# Patient Record
Sex: FEMALE | Race: WHITE | Employment: OTHER | RURAL
[De-identification: names, ages, dates, MRNs, and addresses within clinical notes are randomized per-mention and may not be internally consistent; named-entity substitution may affect disease eponyms.]

---

## 2017-07-25 ENCOUNTER — HOSPITAL ENCOUNTER (OUTPATIENT)
Dept: OTHER | Age: 57
Discharge: HOME | End: 2017-07-25
Attending: NURSE PRACTITIONER | Admitting: NURSE PRACTITIONER

## 2017-07-26 LAB
A/G RATIO: 1.9 (ref 0.8–2)
ALBUMIN SERPL-MCNC: 4.3 G/DL (ref 3.4–4.8)
ALP BLD-CCNC: 61 U/L (ref 25–100)
ALT SERPL-CCNC: 12 U/L (ref 4–36)
ANION GAP SERPL CALCULATED.3IONS-SCNC: 12 MMOL/L (ref 3–16)
AST SERPL-CCNC: 22 U/L (ref 8–33)
BASOPHILS ABSOLUTE: 0 K/UL (ref 0–0.1)
BASOPHILS RELATIVE PERCENT: 0.5 %
BILIRUB SERPL-MCNC: 0.3 MG/DL (ref 0.3–1.2)
BUN BLDV-MCNC: 21 MG/DL (ref 6–20)
CALCIUM SERPL-MCNC: 9.4 MG/DL (ref 8.5–10.5)
CHLORIDE BLD-SCNC: 107 MMOL/L (ref 98–107)
CO2: 24 MMOL/L (ref 20–30)
CREAT SERPL-MCNC: 0.7 MG/DL (ref 0.4–1.2)
EOSINOPHILS ABSOLUTE: 0.1 K/UL (ref 0–0.4)
EOSINOPHILS RELATIVE PERCENT: 1.1 %
GFR AFRICAN AMERICAN: >59
GFR NON-AFRICAN AMERICAN: >60
GLOBULIN: 2.3 G/DL
GLUCOSE BLD-MCNC: 115 MG/DL (ref 74–106)
HCT VFR BLD CALC: 44 % (ref 37–47)
HEMOGLOBIN: 14.8 G/DL (ref 11.5–16.5)
LYMPHOCYTES ABSOLUTE: 1.7 K/UL (ref 1.5–4)
LYMPHOCYTES RELATIVE PERCENT: 21.1 % (ref 20–40)
MCH RBC QN AUTO: 31.9 PG (ref 27–32)
MCHC RBC AUTO-ENTMCNC: 33.6 G/DL (ref 31–35)
MCV RBC AUTO: 94.8 FL (ref 80–100)
MONOCYTES ABSOLUTE: 0.6 K/UL (ref 0.2–0.8)
MONOCYTES RELATIVE PERCENT: 6.7 % (ref 3–10)
NEUTROPHILS ABSOLUTE: 5.8 K/UL (ref 2–7.5)
NEUTROPHILS RELATIVE PERCENT: 70.6 %
PDW BLD-RTO: 14 % (ref 11–16)
PLATELET # BLD: 258 K/UL (ref 150–400)
PMV BLD AUTO: 11.7 FL (ref 6–10)
POTASSIUM SERPL-SCNC: 4.7 MMOL/L (ref 3.4–5.1)
RBC # BLD: 4.64 M/UL (ref 3.8–5.8)
SODIUM BLD-SCNC: 143 MMOL/L (ref 136–145)
TOTAL PROTEIN: 6.6 G/DL (ref 6.4–8.3)
WBC # BLD: 8.2 K/UL (ref 4–11)

## 2017-07-28 ENCOUNTER — HOSPITAL ENCOUNTER (OUTPATIENT)
Dept: OTHER | Age: 57
Discharge: HOME | End: 2017-07-28
Attending: NURSE PRACTITIONER | Admitting: NURSE PRACTITIONER

## 2017-07-28 LAB
A/G RATIO: 1.8 (ref 0.8–2)
ALBUMIN SERPL-MCNC: 4.8 G/DL (ref 3.4–4.8)
ALP BLD-CCNC: 67 U/L (ref 25–100)
ALT SERPL-CCNC: 16 U/L (ref 4–36)
ANION GAP SERPL CALCULATED.3IONS-SCNC: 14 MMOL/L (ref 3–16)
AST SERPL-CCNC: 23 U/L (ref 8–33)
BASOPHILS ABSOLUTE: 0 K/UL (ref 0–0.1)
BASOPHILS RELATIVE PERCENT: 0.1 %
BILIRUB SERPL-MCNC: 0.7 MG/DL (ref 0.3–1.2)
BUN BLDV-MCNC: 21 MG/DL (ref 6–20)
CALCIUM SERPL-MCNC: 10.3 MG/DL (ref 8.5–10.5)
CHLORIDE BLD-SCNC: 104 MMOL/L (ref 98–107)
CO2: 26 MMOL/L (ref 20–30)
CREAT SERPL-MCNC: 0.7 MG/DL (ref 0.4–1.2)
EOSINOPHILS ABSOLUTE: 0 K/UL (ref 0–0.4)
EOSINOPHILS RELATIVE PERCENT: 0 %
GFR AFRICAN AMERICAN: >59
GFR NON-AFRICAN AMERICAN: >60
GLOBULIN: 2.7 G/DL
GLUCOSE BLD-MCNC: 143 MG/DL (ref 74–106)
HCT VFR BLD CALC: 44.1 % (ref 37–47)
HEMOGLOBIN: 14.9 G/DL (ref 11.5–16.5)
LYMPHOCYTES ABSOLUTE: 0.8 K/UL (ref 1.5–4)
LYMPHOCYTES RELATIVE PERCENT: 10.9 % (ref 20–40)
MCH RBC QN AUTO: 31.8 PG (ref 27–32)
MCHC RBC AUTO-ENTMCNC: 33.8 G/DL (ref 31–35)
MCV RBC AUTO: 94.2 FL (ref 80–100)
MONOCYTES ABSOLUTE: 0.2 K/UL (ref 0.2–0.8)
MONOCYTES RELATIVE PERCENT: 1.9 % (ref 3–10)
NEUTROPHILS ABSOLUTE: 6.7 K/UL (ref 2–7.5)
NEUTROPHILS RELATIVE PERCENT: 87.1 %
PDW BLD-RTO: 13.5 % (ref 11–16)
PLATELET # BLD: 267 K/UL (ref 150–400)
PMV BLD AUTO: 11.5 FL (ref 6–10)
POTASSIUM SERPL-SCNC: 5.2 MMOL/L (ref 3.4–5.1)
RBC # BLD: 4.68 M/UL (ref 3.8–5.8)
SODIUM BLD-SCNC: 144 MMOL/L (ref 136–145)
TOTAL PROTEIN: 7.5 G/DL (ref 6.4–8.3)
WBC # BLD: 7.7 K/UL (ref 4–11)

## 2017-08-16 ENCOUNTER — OFFICE VISIT (OUTPATIENT)
Dept: SURGERY | Facility: CLINIC | Age: 57
End: 2017-08-16

## 2017-08-16 VITALS
DIASTOLIC BLOOD PRESSURE: 68 MMHG | HEART RATE: 67 BPM | BODY MASS INDEX: 20.14 KG/M2 | HEIGHT: 69 IN | OXYGEN SATURATION: 100 % | SYSTOLIC BLOOD PRESSURE: 128 MMHG | TEMPERATURE: 98.6 F | WEIGHT: 136 LBS

## 2017-08-16 DIAGNOSIS — L03.116 CELLULITIS OF LEFT LOWER EXTREMITY: ICD-10-CM

## 2017-08-16 DIAGNOSIS — R60.0 EDEMA OF LEFT FOOT: Primary | ICD-10-CM

## 2017-08-16 PROCEDURE — 99203 OFFICE O/P NEW LOW 30 MIN: CPT | Performed by: SURGERY

## 2017-08-16 RX ORDER — TOPIRAMATE 25 MG/1
25 TABLET ORAL 2 TIMES DAILY
COMMUNITY
End: 2019-04-26

## 2017-08-16 RX ORDER — ESTRADIOL 0.5 MG/1
0.5 TABLET ORAL DAILY
COMMUNITY
End: 2019-04-26 | Stop reason: SDUPTHER

## 2017-08-16 RX ORDER — SUMATRIPTAN 100 MG/1
100 TABLET, FILM COATED ORAL
COMMUNITY
End: 2019-04-26 | Stop reason: SDUPTHER

## 2017-08-16 NOTE — PROGRESS NOTES
Patient: Sharda Broderick    YOB: 1960    Date: 08/16/2017    Primary Care Provider: JUANITA Bansal    Reason for Consultation: Swollen left foot    Chief complaint:   Chief Complaint   Patient presents with   • Foot Swelling     Swollen left foot after being stung       Subjective .     History of present illness:  The patient is here today for the evaluation and treatment of a swollen left foot after being stung by some sort of insect about a month ago.  She states that her left foot was swollen, red and painful.  The swelling went past the knee but has since then gone down. Her symptoms have much improved since she was first stung. She still complains tenderness, swelling and redness.  She does notice an improvement when she keeps her foot elevated and after she took some antibiotics.      Review of Systems   Constitutional: Negative for chills, fever and unexpected weight change.   HENT: Negative for hearing loss, trouble swallowing and voice change.    Eyes: Negative for visual disturbance.   Respiratory: Negative for apnea, cough, chest tightness, shortness of breath and wheezing.    Cardiovascular: Positive for leg swelling (left foot). Negative for chest pain and palpitations.   Gastrointestinal: Negative for abdominal distention, abdominal pain, anal bleeding, blood in stool, constipation, diarrhea, nausea, rectal pain and vomiting.   Endocrine: Negative for cold intolerance and heat intolerance.   Genitourinary: Negative for difficulty urinating, dysuria and flank pain.   Musculoskeletal: Negative for back pain and gait problem.   Skin: Positive for color change. Negative for rash and wound.   Neurological: Negative for dizziness, syncope, speech difficulty, weakness, light-headedness, numbness and headaches.   Hematological: Negative for adenopathy. Does not bruise/bleed easily.   Psychiatric/Behavioral: Negative for confusion. The patient is not nervous/anxious.   "      Allergies:  No Known Allergies    Medications:    Current Outpatient Prescriptions:   •  estradiol (ESTRACE) 0.5 MG tablet, Take 0.5 mg by mouth Daily., Disp: , Rfl:   •  SUMAtriptan (IMITREX) 100 MG tablet, Take 100 mg by mouth Every 2 (Two) Hours As Needed for Migraine., Disp: , Rfl:   •  topiramate (TOPAMAX) 25 MG tablet, Take 25 mg by mouth 2 (Two) Times a Day., Disp: , Rfl:     History\"  History reviewed. No pertinent past medical history.    Past Surgical History:   Procedure Laterality Date   • HYSTERECTOMY         Family History   Problem Relation Age of Onset   • Hypertension Mother    • Heart disease Mother    • COPD Mother    • No Known Problems Brother    • Heart disease Brother    • COPD Brother        Social History   Substance Use Topics   • Smoking status: Current Every Day Smoker     Packs/day: 0.25     Types: Cigarettes   • Smokeless tobacco: Never Used   • Alcohol use No        Objective     Vital Signs:   /68 (BP Location: Left arm)  Pulse 67  Temp 98.6 °F (37 °C) (Temporal Artery )   Ht 69\" (175.3 cm)  Wt 136 lb (61.7 kg)  SpO2 100%  BMI 20.08 kg/m2    Physical Exam:   General Appearance:    Alert, cooperative, in no acute distress   Head:    Normocephalic, without obvious abnormality, atraumatic   Eyes:            Lids and lashes normal, conjunctivae and sclerae normal, no   icterus, no pallor, corneas clear, PERRLA   Ears:    Ears appear intact with no abnormalities noted   Throat:   No oral lesions, no thrush, oral mucosa moist   Neck:   No adenopathy, supple, trachea midline, no thyromegaly, no   carotid bruit, no JVD   Lungs:     Clear to auscultation,respirations regular, even and                  unlabored    Heart:    Regular rhythm and normal rate, normal S1 and S2, no            murmur, no gallop, no rub, no click   Chest Wall:    No abnormalities observed   Abdomen:     Normal bowel sounds, no masses, no organomegaly, soft        non-tender, non-distended, no " guarding, no rebound                tenderness   Extremities:   Moves all extremities well, no edema, no cyanosis, no             Redness.  Patient has mild redness on the left foot, some hemosiderin deposits.  Excellent pulses.     Pulses:   Pulses palpable and equal bilaterally   Skin:   No bleeding, bruising or rash   Lymph nodes:   No palpable adenopathy   Neurologic:   Cranial nerves 2 - 12 grossly intact, sensation intact, DTR       present and equal bilaterally     Results Review:   I reviewed the patient's new clinical results.    Assessment/Plan     1. Edema of left foot    2. Cellulitis of left lower extremity        Cellulitis completely resolved, minimal edema.  Ultrasound indicates no fluid collection or abscess in the foot.  Patient reassured, okay to resume regular activities.  Recommend compression stockings during the day for prolonged walking and to limit edema and discomfort.  Follow-up as needed    I discussed the patients findings and my recommendations with patient    Electronically signed by Regino Toure MD  08/16/17      .  Scribed for Regino Toure MD by Holly Mcnair. 8/16/2017  11:33 AM

## 2019-04-26 ENCOUNTER — OFFICE VISIT (OUTPATIENT)
Dept: INTERNAL MEDICINE | Facility: CLINIC | Age: 59
End: 2019-04-26

## 2019-04-26 VITALS
WEIGHT: 147.8 LBS | TEMPERATURE: 98.1 F | OXYGEN SATURATION: 94 % | SYSTOLIC BLOOD PRESSURE: 102 MMHG | DIASTOLIC BLOOD PRESSURE: 80 MMHG | HEIGHT: 69 IN | BODY MASS INDEX: 21.89 KG/M2 | HEART RATE: 55 BPM

## 2019-04-26 DIAGNOSIS — N95.1 MENOPAUSAL SYNDROME: ICD-10-CM

## 2019-04-26 DIAGNOSIS — G43.909 MIGRAINE WITHOUT STATUS MIGRAINOSUS, NOT INTRACTABLE, UNSPECIFIED MIGRAINE TYPE: ICD-10-CM

## 2019-04-26 DIAGNOSIS — Z12.31 BREAST CANCER SCREENING BY MAMMOGRAM: ICD-10-CM
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
## 2019-04-26 PROCEDURE — 99204 OFFICE O/P NEW MOD 45 MIN: CPT | Performed by: FAMILY MEDICINE

## 2019-04-26 RX ORDER — TOPIRAMATE 25 MG/1
25 TABLET ORAL DAILY
Qty: 90 TABLET | Refills: 2 | Status: SHIPPED | OUTPATIENT
Start: 2019-04-26 | End: 2020-11-22 | Stop reason: SDUPTHER

## 2019-04-26 RX ORDER — VENLAFAXINE HYDROCHLORIDE 37.5 MG/1
37.5 CAPSULE, EXTENDED RELEASE ORAL DAILY
Qty: 90 CAPSULE | Refills: 1 | Status: SHIPPED | OUTPATIENT
Start: 2019-04-26 | End: 2019-11-26

## 2019-04-26 RX ORDER — TOPIRAMATE 25 MG/1
25 TABLET ORAL DAILY
COMMUNITY
End: 2019-04-26 | Stop reason: SDUPTHER

## 2019-04-26 RX ORDER — SUMATRIPTAN 100 MG/1
100 TABLET, FILM COATED ORAL
Qty: 9 TABLET | Refills: 5 | Status: SHIPPED | OUTPATIENT
Start: 2019-04-26 | End: 2020-05-28 | Stop reason: SDUPTHER

## 2019-04-26 RX ORDER — CLOBETASOL PROPIONATE 0.5 MG/ML
LOTION TOPICAL 2 TIMES DAILY
Qty: 118 ML | Refills: 1 | Status: SHIPPED | OUTPATIENT
Start: 2019-04-26 | End: 2019-11-26

## 2019-04-26 RX ORDER — ESTRADIOL 0.5 MG/1
0.5 TABLET ORAL DAILY
Qty: 90 TABLET | Refills: 1 | Status: SHIPPED | OUTPATIENT
Start: 2019-04-26 | End: 2019-11-06 | Stop reason: SDUPTHER

## 2019-04-26 NOTE — PROGRESS NOTES
Sharda Broderick is a 58 y.o. female.    Chief Complaint   Patient presents with   • Insect Bite     all over arms   • Establish Care   • Migraine       HPI   Patient presents today to establish care.  She states that she has chigger bites to arm bilaterally and ankles. She hasn't used anything to help with this issue  She is requesting a steroid shot.     Patient has migraines.  She reports taking imitrex as needed for actue relief of migraines with good response.  She takes this medication rarely.  She reports that she only takes 25mg of topamax daily due to side effects of tinnitus.      Patient reports menopausal symptoms of hot flashes.  She has been on estrace for many years.  She takes 1mg off and on a few days out of the week.      Patient is not up to date on colonoscopy and does not wish to have this done at this time.  She is willing to be scheduled for a mammogram.   The following portions of the patient's history were reviewed and updated as appropriate: allergies, current medications, past family history, past medical history, past social history, past surgical history and problem list.     Past Medical History:   Diagnosis Date   • Headache        Past Surgical History:   Procedure Laterality Date   • HYSTERECTOMY      complete   • TOOTH EXTRACTION         Family History   Problem Relation Age of Onset   • Hypertension Mother    • Heart disease Mother    • COPD Mother    • No Known Problems Brother    • Heart disease Brother    • COPD Brother        Social History     Socioeconomic History   • Marital status: Unknown     Spouse name: Not on file   • Number of children: Not on file   • Years of education: Not on file   • Highest education level: Not on file   Tobacco Use   • Smoking status: Former Smoker     Packs/day: 0.25     Types: Cigarettes     Last attempt to quit:      Years since quittin.3   • Smokeless tobacco: Never Used   Substance and Sexual Activity   • Alcohol use: No     Comment:  "rarely   • Drug use: No   • Sexual activity: Yes     Partners: Male     Birth control/protection: Surgical       No Known Allergies      Current Outpatient Medications:   •  estradiol (ESTRACE) 0.5 MG tablet, Take 1 tablet by mouth Daily., Disp: 90 tablet, Rfl: 1  •  SUMAtriptan (IMITREX) 100 MG tablet, Take 1 tablet by mouth Every 2 (Two) Hours As Needed for Migraine., Disp: 9 tablet, Rfl: 5  •  topiramate (TOPAMAX) 25 MG tablet, Take 1 tablet by mouth Daily., Disp: 90 tablet, Rfl: 2  •  clobetasol (CLOBEX) 0.05 % lotion, Apply  topically to the appropriate area as directed 2 (Two) Times a Day., Disp: 118 mL, Rfl: 1  •  venlafaxine XR (EFFEXOR XR) 37.5 MG 24 hr capsule, Take 1 capsule by mouth Daily., Disp: 90 capsule, Rfl: 1    ROS    Review of Systems   Constitutional: Negative for chills, fatigue and fever.   HENT: Negative for congestion, postnasal drip and sore throat.    Eyes: Negative for blurred vision and visual disturbance.   Respiratory: Negative for cough, shortness of breath and wheezing.    Cardiovascular: Negative for chest pain and leg swelling.   Gastrointestinal: Negative for abdominal pain, constipation, diarrhea, nausea and vomiting.   Endocrine: Positive for heat intolerance. Negative for cold intolerance.   Genitourinary: Negative for dysuria and frequency.   Musculoskeletal: Negative for arthralgias and back pain.   Skin: Positive for rash. Negative for color change.   Allergic/Immunologic: Negative for environmental allergies.   Neurological: Positive for headache. Negative for weakness and numbness.   Hematological: Does not bruise/bleed easily.   Psychiatric/Behavioral: Negative for depressed mood. The patient is nervous/anxious.        Vitals:    04/26/19 1041   BP: 102/80   BP Location: Left arm   Patient Position: Sitting   Cuff Size: Adult   Pulse: 55   Temp: 98.1 °F (36.7 °C)   TempSrc: Oral   SpO2: 94%   Weight: 67 kg (147 lb 12.8 oz)   Height: 175.3 cm (69\")     Body mass index is " 21.83 kg/m².    Physical Exam     Physical Exam   Constitutional: She is oriented to person, place, and time. She appears well-developed and well-nourished. No distress.   HENT:   Head: Normocephalic and atraumatic.   Right Ear: External ear normal.   Left Ear: External ear normal.   Mouth/Throat: Oropharynx is clear and moist.   Eyes: Conjunctivae and EOM are normal. Pupils are equal, round, and reactive to light.   Neck: Normal range of motion. Neck supple.   Cardiovascular: Normal rate and regular rhythm.   No murmur heard.  Pulmonary/Chest: Effort normal and breath sounds normal. No respiratory distress. She has no wheezes.   Abdominal: Soft. Bowel sounds are normal. She exhibits no distension. There is no tenderness.   Musculoskeletal: She exhibits no edema or deformity.   Lymphadenopathy:     She has no cervical adenopathy.   Neurological: She is alert and oriented to person, place, and time. No cranial nerve deficit.   Skin: Skin is warm and dry. Rash (multiple insect bites to arms bilaterally) noted.   Psychiatric: She has a normal mood and affect. Her behavior is normal.       Assessment/Plan    Problem List Items Addressed This Visit        Cardiovascular and Mediastinum    Migraine without status migrainosus, not intractable     Improved with topamax.  I'm concerned the patient may not be taking topamax regularly and is likely not doing much good.  However, migraines are rare and she may take imitrex prn.               Relevant Medications    SUMAtriptan (IMITREX) 100 MG tablet    topiramate (TOPAMAX) 25 MG tablet    venlafaxine XR (EFFEXOR XR) 37.5 MG 24 hr capsule       Other    Menopausal syndrome     Advised to decrease estrace to 0.5 mg and will start effexor 37.5mg.          Bite, insect - Primary     Agree with patient that she likely has chigger bites.  Discussed that there is no way to necessarily get rid of them, but can help associated itching and welts with steroid lotion.            Other  Visit Diagnoses     Breast cancer screening by mammogram        Relevant Orders    Mammo Diagnostic Digital Tomosynthesis Right With CAD          New Medications Ordered This Visit   Medications   • SUMAtriptan (IMITREX) 100 MG tablet     Sig: Take 1 tablet by mouth Every 2 (Two) Hours As Needed for Migraine.     Dispense:  9 tablet     Refill:  5   • topiramate (TOPAMAX) 25 MG tablet     Sig: Take 1 tablet by mouth Daily.     Dispense:  90 tablet     Refill:  2   • clobetasol (CLOBEX) 0.05 % lotion     Sig: Apply  topically to the appropriate area as directed 2 (Two) Times a Day.     Dispense:  118 mL     Refill:  1   • estradiol (ESTRACE) 0.5 MG tablet     Sig: Take 1 tablet by mouth Daily.     Dispense:  90 tablet     Refill:  1   • venlafaxine XR (EFFEXOR XR) 37.5 MG 24 hr capsule     Sig: Take 1 capsule by mouth Daily.     Dispense:  90 capsule     Refill:  1       No orders of the defined types were placed in this encounter.      Return for Annual.      Marisol Catherine DO

## 2019-04-29 ENCOUNTER — TELEPHONE (OUTPATIENT)
Dept: INTERNAL MEDICINE | Facility: CLINIC | Age: 59
End: 2019-04-29

## 2019-04-29 PROBLEM — N95.1 MENOPAUSAL SYNDROME: Status: ACTIVE | Noted: 2019-04-29

## 2019-04-29 PROBLEM — G43.909 MIGRAINE WITHOUT STATUS MIGRAINOSUS, NOT INTRACTABLE: Status: ACTIVE | Noted: 2019-04-29
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
## 2019-04-30 ENCOUNTER — TELEPHONE (OUTPATIENT)
Dept: INTERNAL MEDICINE | Facility: CLINIC | Age: 59
End: 2019-04-30

## 2019-04-30 NOTE — ASSESSMENT & PLAN NOTE
Agree with patient that she likely has chigger bites.  Discussed that there is no way to necessarily get rid of them, but can help associated itching and welts with steroid lotion.

## 2019-04-30 NOTE — ASSESSMENT & PLAN NOTE
Improved with topamax.  I'm concerned the patient may not be taking topamax regularly and is likely not doing much good.  However, migraines are rare and she may take imitrex prn.

## 2019-04-30 NOTE — TELEPHONE ENCOUNTER
Patient called stating that the pharmacy did not have the meds that prescribed for her. She states she has only received 2 of the 5 medications that were ordered on the 26th.    She is specifically requesting that the topical lotion for the chigger bites be taken care of ASAP because they are causing the patient extreme discomfort.    Please advise.

## 2019-11-06 RX ORDER — ESTRADIOL 0.5 MG/1
TABLET ORAL
Qty: 90 TABLET | Refills: 0 | Status: SHIPPED | OUTPATIENT
Start: 2019-11-06 | End: 2020-11-22 | Stop reason: SDUPTHER

## 2019-11-22 ENCOUNTER — HOSPITAL ENCOUNTER (EMERGENCY)
Facility: HOSPITAL | Age: 59
Discharge: HOME | End: 2019-11-22
Attending: EMERGENCY MEDICINE
Payer: COMMERCIAL

## 2019-11-22 ENCOUNTER — APPOINTMENT (OUTPATIENT)
Dept: GENERAL RADIOLOGY | Facility: HOSPITAL | Age: 59
End: 2019-11-22
Payer: COMMERCIAL

## 2019-11-22 VITALS
RESPIRATION RATE: 18 BRPM | WEIGHT: 150 LBS | BODY MASS INDEX: 22.22 KG/M2 | HEIGHT: 69 IN | SYSTOLIC BLOOD PRESSURE: 132 MMHG | HEART RATE: 80 BPM | OXYGEN SATURATION: 100 % | TEMPERATURE: 97.8 F | DIASTOLIC BLOOD PRESSURE: 67 MMHG

## 2019-11-22 DIAGNOSIS — S52.90XA CLOSED FRACTURE OF DISTAL END OF FOREARM, UNSPECIFIED LATERALITY, INITIAL ENCOUNTER: Primary | ICD-10-CM

## 2019-11-22 DIAGNOSIS — T14.8XXA PUNCTURE WOUND: ICD-10-CM

## 2019-11-22 PROCEDURE — 6360000002 HC RX W HCPCS: Performed by: EMERGENCY MEDICINE

## 2019-11-22 PROCEDURE — 6370000000 HC RX 637 (ALT 250 FOR IP)

## 2019-11-22 PROCEDURE — 2580000003 HC RX 258: Performed by: EMERGENCY MEDICINE

## 2019-11-22 PROCEDURE — 96365 THER/PROPH/DIAG IV INF INIT: CPT

## 2019-11-22 PROCEDURE — 99283 EMERGENCY DEPT VISIT LOW MDM: CPT

## 2019-11-22 PROCEDURE — 73090 X-RAY EXAM OF FOREARM: CPT

## 2019-11-22 PROCEDURE — 96372 THER/PROPH/DIAG INJ SC/IM: CPT

## 2019-11-22 RX ORDER — HYDROCODONE BITARTRATE AND ACETAMINOPHEN 7.5; 325 MG/1; MG/1
1 TABLET ORAL ONCE
Status: COMPLETED | OUTPATIENT
Start: 2019-11-22 | End: 2019-11-22

## 2019-11-22 RX ORDER — HYDROCODONE BITARTRATE AND ACETAMINOPHEN 7.5; 325 MG/1; MG/1
TABLET ORAL
Status: COMPLETED
Start: 2019-11-22 | End: 2019-11-22

## 2019-11-22 RX ORDER — MORPHINE SULFATE 4 MG/ML
8 INJECTION, SOLUTION INTRAMUSCULAR; INTRAVENOUS ONCE
Status: COMPLETED | OUTPATIENT
Start: 2019-11-22 | End: 2019-11-22

## 2019-11-22 RX ORDER — HYDROCODONE BITARTRATE AND ACETAMINOPHEN 5; 325 MG/1; MG/1
1 TABLET ORAL EVERY 6 HOURS PRN
Qty: 12 TABLET | Refills: 0 | Status: SHIPPED | OUTPATIENT
Start: 2019-11-22 | End: 2019-11-25

## 2019-11-22 RX ORDER — CEPHALEXIN 500 MG/1
500 CAPSULE ORAL 4 TIMES DAILY
Qty: 28 CAPSULE | Refills: 0 | Status: SHIPPED | OUTPATIENT
Start: 2019-11-22 | End: 2019-11-29

## 2019-11-22 RX ADMIN — CEFAZOLIN 1 G: 1 INJECTION, POWDER, FOR SOLUTION INTRAMUSCULAR; INTRAVENOUS at 17:45

## 2019-11-22 RX ADMIN — HYDROCODONE BITARTRATE AND ACETAMINOPHEN 1 TABLET: 7.5; 325 TABLET ORAL at 18:19

## 2019-11-22 RX ADMIN — MORPHINE SULFATE 8 MG: 4 INJECTION INTRAVENOUS at 16:12

## 2019-11-22 ASSESSMENT — ENCOUNTER SYMPTOMS
HEMATOCHEZIA: 0
ABDOMINAL PAIN: 0
BACK PAIN: 0
DIARRHEA: 0
SPUTUM PRODUCTION: 0
STRIDOR: 0
COUGH: 0
VOMITING: 0
SORE THROAT: 0
NAUSEA: 0
EYE DISCHARGE: 0
WHEEZING: 0
EYE REDNESS: 0
SHORTNESS OF BREATH: 0

## 2019-11-22 ASSESSMENT — PAIN SCALES - GENERAL
PAINLEVEL_OUTOF10: 7
PAINLEVEL_OUTOF10: 5

## 2019-11-26 ENCOUNTER — APPOINTMENT (OUTPATIENT)
Dept: PREADMISSION TESTING | Facility: HOSPITAL | Age: 59
End: 2019-11-26

## 2019-11-26 ENCOUNTER — OFFICE VISIT (OUTPATIENT)
Dept: ORTHOPEDIC SURGERY | Facility: CLINIC | Age: 59
End: 2019-11-26

## 2019-11-26 ENCOUNTER — PREP FOR SURGERY (OUTPATIENT)
Dept: OTHER | Facility: HOSPITAL | Age: 59
End: 2019-11-26

## 2019-11-26 VITALS — BODY MASS INDEX: 21.77 KG/M2 | HEIGHT: 69 IN | RESPIRATION RATE: 18 BRPM | WEIGHT: 147 LBS

## 2019-11-26 DIAGNOSIS — S52.602A CLOSED FRACTURE OF DISTAL END OF LEFT ULNA, UNSPECIFIED FRACTURE MORPHOLOGY, INITIAL ENCOUNTER: ICD-10-CM

## 2019-11-26 DIAGNOSIS — M25.532 ARTHRALGIA OF WRIST, LEFT: Primary | ICD-10-CM

## 2019-11-26 DIAGNOSIS — S52.602A CLOSED FRACTURE OF DISTAL END OF LEFT ULNA, UNSPECIFIED FRACTURE MORPHOLOGY, INITIAL ENCOUNTER: Primary | ICD-10-CM

## 2019-11-26 DIAGNOSIS — S50.812A ABRASION OF LEFT FOREARM, INITIAL ENCOUNTER: ICD-10-CM

## 2019-11-26 DIAGNOSIS — S69.92XA WRIST INJURY, LEFT, INITIAL ENCOUNTER: ICD-10-CM

## 2019-11-26 LAB
ALBUMIN SERPL-MCNC: 4.5 G/DL (ref 3.5–5.2)
ALBUMIN/GLOB SERPL: 1.3 G/DL
ALP SERPL-CCNC: 89 U/L (ref 39–117)
ALT SERPL W P-5'-P-CCNC: 11 U/L (ref 1–33)
ANION GAP SERPL CALCULATED.3IONS-SCNC: 12.5 MMOL/L (ref 5–15)
AST SERPL-CCNC: 20 U/L (ref 1–32)
BASOPHILS # BLD AUTO: 0.05 10*3/MM3 (ref 0–0.2)
BASOPHILS NFR BLD AUTO: 0.8 % (ref 0–1.5)
BILIRUB SERPL-MCNC: 0.2 MG/DL (ref 0.2–1.2)
BUN BLD-MCNC: 14 MG/DL (ref 6–20)
BUN/CREAT SERPL: 15.9 (ref 7–25)
CALCIUM SPEC-SCNC: 9.9 MG/DL (ref 8.6–10.5)
CHLORIDE SERPL-SCNC: 103 MMOL/L (ref 98–107)
CO2 SERPL-SCNC: 25.5 MMOL/L (ref 22–29)
CREAT BLD-MCNC: 0.88 MG/DL (ref 0.57–1)
DEPRECATED RDW RBC AUTO: 42.1 FL (ref 37–54)
EOSINOPHIL # BLD AUTO: 0.05 10*3/MM3 (ref 0–0.4)
EOSINOPHIL NFR BLD AUTO: 0.8 % (ref 0.3–6.2)
ERYTHROCYTE [DISTWIDTH] IN BLOOD BY AUTOMATED COUNT: 12.1 % (ref 12.3–15.4)
GFR SERPL CREATININE-BSD FRML MDRD: 66 ML/MIN/1.73
GLOBULIN UR ELPH-MCNC: 3.5 GM/DL
GLUCOSE BLD-MCNC: 101 MG/DL (ref 65–99)
HCT VFR BLD AUTO: 44.3 % (ref 34–46.6)
HGB BLD-MCNC: 14.9 G/DL (ref 12–15.9)
IMM GRANULOCYTES # BLD AUTO: 0.01 10*3/MM3 (ref 0–0.05)
IMM GRANULOCYTES NFR BLD AUTO: 0.2 % (ref 0–0.5)
LYMPHOCYTES # BLD AUTO: 1.73 10*3/MM3 (ref 0.7–3.1)
LYMPHOCYTES NFR BLD AUTO: 28.5 % (ref 19.6–45.3)
MCH RBC QN AUTO: 31.8 PG (ref 26.6–33)
MCHC RBC AUTO-ENTMCNC: 33.6 G/DL (ref 31.5–35.7)
MCV RBC AUTO: 94.5 FL (ref 79–97)
MONOCYTES # BLD AUTO: 0.36 10*3/MM3 (ref 0.1–0.9)
MONOCYTES NFR BLD AUTO: 5.9 % (ref 5–12)
NEUTROPHILS # BLD AUTO: 3.88 10*3/MM3 (ref 1.7–7)
NEUTROPHILS NFR BLD AUTO: 63.8 % (ref 42.7–76)
NRBC BLD AUTO-RTO: 0 /100 WBC (ref 0–0.2)
PLATELET # BLD AUTO: 252 10*3/MM3 (ref 140–450)
PMV BLD AUTO: 10.3 FL (ref 6–12)
POTASSIUM BLD-SCNC: 4.2 MMOL/L (ref 3.5–5.2)
PROT SERPL-MCNC: 8 G/DL (ref 6–8.5)
RBC # BLD AUTO: 4.69 10*6/MM3 (ref 3.77–5.28)
SODIUM BLD-SCNC: 141 MMOL/L (ref 136–145)
WBC NRBC COR # BLD: 6.08 10*3/MM3 (ref 3.4–10.8)

## 2019-11-26 PROCEDURE — 36415 COLL VENOUS BLD VENIPUNCTURE: CPT

## 2019-11-26 PROCEDURE — 85025 COMPLETE CBC W/AUTO DIFF WBC: CPT | Performed by: PHYSICIAN ASSISTANT

## 2019-11-26 PROCEDURE — 29125 APPL SHORT ARM SPLINT STATIC: CPT | Performed by: PHYSICIAN ASSISTANT

## 2019-11-26 PROCEDURE — 87081 CULTURE SCREEN ONLY: CPT | Performed by: PHYSICIAN ASSISTANT

## 2019-11-26 PROCEDURE — 80053 COMPREHEN METABOLIC PANEL: CPT | Performed by: PHYSICIAN ASSISTANT

## 2019-11-26 PROCEDURE — 99203 OFFICE O/P NEW LOW 30 MIN: CPT | Performed by: PHYSICIAN ASSISTANT

## 2019-11-26 RX ORDER — CEPHALEXIN 500 MG/1
CAPSULE ORAL
Refills: 0 | Status: ON HOLD | COMMUNITY
Start: 2019-11-22 | End: 2019-12-02

## 2019-11-26 RX ORDER — IBUPROFEN 200 MG
200 TABLET ORAL EVERY 6 HOURS PRN
COMMUNITY
End: 2020-02-25

## 2019-11-26 RX ORDER — CEFAZOLIN SODIUM 2 G/50ML
2 SOLUTION INTRAVENOUS ONCE
Status: CANCELLED | OUTPATIENT
Start: 2019-12-02 | End: 2019-11-26

## 2019-11-26 RX ORDER — ACETAMINOPHEN 500 MG
500 TABLET ORAL EVERY 6 HOURS PRN
COMMUNITY
End: 2019-12-02 | Stop reason: HOSPADM

## 2019-11-26 RX ORDER — HYDROCODONE BITARTRATE AND ACETAMINOPHEN 5; 325 MG/1; MG/1
TABLET ORAL
Refills: 0 | Status: ON HOLD | COMMUNITY
Start: 2019-11-22 | End: 2019-12-02

## 2019-11-26 NOTE — PROGRESS NOTES
Subjective   Patient ID: Sharda Broderick is a 59 y.o. right hand dominant female  Injury of the Left Arm (Patient reports getting kicked by her horse on 19 when three of the horses were fussing over food)         History of Present Illness    Patient presents as a new patient with complaints of left forearm injury.  She states while feeding a horse on 2019. She was seen at [facility] and diagnosed with distal ulna fracture she was placed in a splint.  Patient is finishing up taking Keflex for an abrasion to the left forearm.         Past Medical History:   Diagnosis Date   • Headache         Past Surgical History:   Procedure Laterality Date   • HYSTERECTOMY      complete   • TOOTH EXTRACTION         Family History   Problem Relation Age of Onset   • Hypertension Mother    • Heart disease Mother    • COPD Mother    • No Known Problems Brother    • Heart disease Brother    • COPD Brother        Social History     Socioeconomic History   • Marital status:      Spouse name: Not on file   • Number of children: Not on file   • Years of education: Not on file   • Highest education level: Not on file   Tobacco Use   • Smoking status: Former Smoker     Packs/day: 0.25     Types: Cigarettes     Last attempt to quit:      Years since quittin.9   • Smokeless tobacco: Current User   Substance and Sexual Activity   • Alcohol use: No     Comment: rarely   • Drug use: No   • Sexual activity: Yes     Partners: Male     Birth control/protection: Surgical         Current Outpatient Medications:   •  cephalexin (KEFLEX) 500 MG capsule, take 1 capsule by mouth four times a day for 7 days, Disp: , Rfl: 0  •  estradiol (ESTRACE) 0.5 MG tablet, TAKE ONE TABLET BY MOUTH DAILY, Disp: 90 tablet, Rfl: 0  •  HYDROcodone-acetaminophen (NORCO) 5-325 MG per tablet, take 1 tablet by mouth every 6 hours if needed for pain for up to 3 days, Disp: , Rfl: 0  •  SUMAtriptan (IMITREX) 100 MG tablet, Take 1 tablet by mouth Every 2  "(Two) Hours As Needed for Migraine., Disp: 9 tablet, Rfl: 5  •  topiramate (TOPAMAX) 25 MG tablet, Take 1 tablet by mouth Daily., Disp: 90 tablet, Rfl: 2    No Known Allergies    Review of Systems   Constitutional: Negative for diaphoresis, fever and unexpected weight change.   HENT: Negative for dental problem and sore throat.    Eyes: Negative for visual disturbance.   Respiratory: Negative for shortness of breath.    Cardiovascular: Negative for chest pain.   Gastrointestinal: Negative for abdominal pain, constipation, diarrhea, nausea and vomiting.   Genitourinary: Negative for difficulty urinating and frequency.   Neurological: Negative for headaches.   Hematological: Does not bruise/bleed easily.       I have reviewed the above medical and surgical history, family history, social history, medications, allergies and review of systems.    Objective   Resp 18   Ht 175.3 cm (69\")   Wt 66.7 kg (147 lb)   BMI 21.71 kg/m²    Physical Exam   Constitutional: She is oriented to person, place, and time. She appears well-developed and well-nourished.   Eyes: Conjunctivae are normal.   Neck: Neck supple. No tracheal deviation present.   Cardiovascular: Normal rate and regular rhythm.   Pulmonary/Chest: Effort normal and breath sounds normal.   Abdominal: She exhibits no distension. There is no tenderness.   Musculoskeletal:        Arms:  Neurological: She is alert and oriented to person, place, and time.   Psychiatric: She has a normal mood and affect.   Nursing note and vitals reviewed.    Left Hand Exam     Other   Erythema: absent  Sensation: normal      Left Elbow Exam   Left elbow exam is normal.    Range of Motion   The patient has normal left elbow ROM.    Muscle Strength   The patient has normal left elbow strength.    Other   Erythema: absent  Sensation: normal             Neurologic Exam     Mental Status   Oriented to person, place, and time.        Neg mary's test  NO TTP the distal radius  There is a small " non infected o.5 cm Abrasion to lateral aspect of distal forearm      Assessment/Plan   Independent Review of Radiographic Studies:    X-ray of the left wrist performed in the office to rule out distal radius fracture.  There is an obvious acute displaced distal ulna fracture      Procedures        Sharda was seen today for injury.    Diagnoses and all orders for this visit:    Arthralgia of wrist, left  -     XR Wrist 3+ View Left    Closed fracture of distal end of left ulna, unspecified fracture morphology, initial encounter    Wrist injury, left, initial encounter    Abrasion of left forearm, initial encounter       Discussion of orthopedic goals  Risk, benefits, and merits of treatment alternatives reviewed with the patient and questions answered  The nature of the proposed surgery reviewed with the patient including risks, benefits, rehabilitation, recovery timeframe, and outcome expectations    Recommendations/Plan:  Patient is encouraged to call or return for any issues or concerns.    Risks, benefits, and alternative treatments discussed with the patient: [x] Yes [] No    Risk benefits and merits of the proposed surgery were discussed and the patient's questions were answered risks discussed including and not limited to:  Anesthesia reactions  Blood loss and possible transfusion  Infection  Deep venous thrombosis and pulmonary embolus  Nerve, vascular or tendon injury  Fracture  Deformity  Stiffness  Weakness  Prosthesis and implant issues such as loosening, material wear or dislocation  Skin necrosis  Revision surgery or further treatment  Recurrence of problem and condition     Informed consent: [] Signed  [x] To be obtained at hospital  [] Both    Patient was placed in an Ortho-Glass sugar tong splint.  PLAN: Left ULNA ORIF    Patient agreeable to call or return sooner for any concerns.    Discussed the patient's plan of care with Dr. Macdonald- He concurs with plan of care           EMR Dragon-liliana  disclaimer:  This encounter note is an electronic transcription of spoken language to printed text.  Electronic transcription of spoken language may permit erroneous or at times nonsensical words or phrases to be inadvertently transcribed.  Although I have reviewed the note for such errors, some may still exist

## 2019-11-27 ENCOUNTER — TELEPHONE (OUTPATIENT)
Dept: ORTHOPEDIC SURGERY | Facility: CLINIC | Age: 59
End: 2019-11-27

## 2019-11-27 LAB — MRSA SPEC QL CULT: NORMAL

## 2019-12-02 ENCOUNTER — ANESTHESIA EVENT (OUTPATIENT)
Dept: PERIOP | Facility: HOSPITAL | Age: 59
End: 2019-12-02

## 2019-12-02 ENCOUNTER — ANESTHESIA (OUTPATIENT)
Dept: PERIOP | Facility: HOSPITAL | Age: 59
End: 2019-12-02

## 2019-12-02 ENCOUNTER — HOSPITAL ENCOUNTER (OUTPATIENT)
Facility: HOSPITAL | Age: 59
Setting detail: HOSPITAL OUTPATIENT SURGERY
Discharge: HOME | End: 2019-12-02
Attending: ORTHOPAEDIC SURGERY | Admitting: ORTHOPAEDIC SURGERY

## 2019-12-02 ENCOUNTER — PREP FOR SURGERY (OUTPATIENT)
Dept: OTHER | Facility: HOSPITAL | Age: 59
End: 2019-12-02

## 2019-12-02 ENCOUNTER — APPOINTMENT (OUTPATIENT)
Dept: GENERAL RADIOLOGY | Facility: HOSPITAL | Age: 59
End: 2019-12-02

## 2019-12-02 VITALS
OXYGEN SATURATION: 95 % | HEART RATE: 72 BPM | SYSTOLIC BLOOD PRESSURE: 120 MMHG | DIASTOLIC BLOOD PRESSURE: 66 MMHG | RESPIRATION RATE: 16 BRPM | TEMPERATURE: 97.3 F

## 2019-12-02 PROCEDURE — 25010000003 CEFAZOLIN SODIUM-DEXTROSE 2-3 GM-%(50ML) RECONSTITUTED SOLUTION: Performed by: PHYSICIAN ASSISTANT

## 2019-12-02 PROCEDURE — C1713 ANCHOR/SCREW BN/BN,TIS/BN: HCPCS | Performed by: ORTHOPAEDIC SURGERY

## 2019-12-02 PROCEDURE — 76000 FLUOROSCOPY <1 HR PHYS/QHP: CPT

## 2019-12-02 PROCEDURE — 25010000002 ONDANSETRON PER 1 MG: Performed by: NURSE ANESTHETIST, CERTIFIED REGISTERED

## 2019-12-02 PROCEDURE — 25010000002 PROPOFOL 10 MG/ML EMULSION: Performed by: NURSE ANESTHETIST, CERTIFIED REGISTERED

## 2019-12-02 PROCEDURE — 25545 OPTX ULNAR SHFT FX INT FIXJ: CPT | Performed by: ORTHOPAEDIC SURGERY

## 2019-12-02 PROCEDURE — 25010000002 MIDAZOLAM PER 1MG: Performed by: NURSE ANESTHETIST, CERTIFIED REGISTERED

## 2019-12-02 DEVICE — SCRW LK S/TAP STRDRV 3.5X14MM: Type: IMPLANTABLE DEVICE | Site: WRIST | Status: FUNCTIONAL

## 2019-12-02 DEVICE — PLT LCP 6HL 3.5X11MM: Type: IMPLANTABLE DEVICE | Site: WRIST | Status: FUNCTIONAL

## 2019-12-02 DEVICE — SCRW CORT S/TAP 3.5X14MM: Type: IMPLANTABLE DEVICE | Site: WRIST | Status: FUNCTIONAL

## 2019-12-02 DEVICE — SCRW LK S/TAP STRDRV 3.5X16MM: Type: IMPLANTABLE DEVICE | Site: WRIST | Status: FUNCTIONAL

## 2019-12-02 RX ORDER — DEXAMETHASONE SODIUM PHOSPHATE 10 MG/ML
INJECTION, SOLUTION INTRAMUSCULAR; INTRAVENOUS
Status: DISCONTINUED
Start: 2019-12-02 | End: 2019-12-02 | Stop reason: HOSPADM

## 2019-12-02 RX ORDER — LIDOCAINE HYDROCHLORIDE 20 MG/ML
INJECTION, SOLUTION INTRAVENOUS AS NEEDED
Status: DISCONTINUED | OUTPATIENT
Start: 2019-12-02 | End: 2019-12-02 | Stop reason: SURG

## 2019-12-02 RX ORDER — ONDANSETRON 2 MG/ML
INJECTION INTRAMUSCULAR; INTRAVENOUS AS NEEDED
Status: DISCONTINUED | OUTPATIENT
Start: 2019-12-02 | End: 2019-12-02 | Stop reason: SURG

## 2019-12-02 RX ORDER — BUPIVACAINE HYDROCHLORIDE 5 MG/ML
INJECTION, SOLUTION EPIDURAL; INTRACAUDAL
Status: COMPLETED
Start: 2019-12-02 | End: 2019-12-02

## 2019-12-02 RX ORDER — LIDOCAINE HYDROCHLORIDE 20 MG/ML
INJECTION, SOLUTION INFILTRATION; PERINEURAL
Status: COMPLETED
Start: 2019-12-02 | End: 2019-12-02

## 2019-12-02 RX ORDER — SODIUM CHLORIDE, SODIUM LACTATE, POTASSIUM CHLORIDE, CALCIUM CHLORIDE 600; 310; 30; 20 MG/100ML; MG/100ML; MG/100ML; MG/100ML
1000 INJECTION, SOLUTION INTRAVENOUS CONTINUOUS
Status: DISCONTINUED | OUTPATIENT
Start: 2019-12-02 | End: 2019-12-02 | Stop reason: HOSPADM

## 2019-12-02 RX ORDER — CEFAZOLIN SODIUM 2 G/50ML
2 SOLUTION INTRAVENOUS ONCE
Status: COMPLETED | OUTPATIENT
Start: 2019-12-02 | End: 2019-12-02

## 2019-12-02 RX ORDER — MIDAZOLAM HYDROCHLORIDE 2 MG/2ML
INJECTION, SOLUTION INTRAMUSCULAR; INTRAVENOUS AS NEEDED
Status: DISCONTINUED | OUTPATIENT
Start: 2019-12-02 | End: 2019-12-02 | Stop reason: SURG

## 2019-12-02 RX ORDER — OXYCODONE HYDROCHLORIDE AND ACETAMINOPHEN 5; 325 MG/1; MG/1
1 TABLET ORAL EVERY 6 HOURS PRN
Qty: 15 TABLET | Refills: 0 | Status: SHIPPED | OUTPATIENT
Start: 2019-12-02 | End: 2020-05-27

## 2019-12-02 RX ORDER — MIDAZOLAM HYDROCHLORIDE 2 MG/2ML
INJECTION, SOLUTION INTRAMUSCULAR; INTRAVENOUS
Status: COMPLETED
Start: 2019-12-02 | End: 2019-12-02

## 2019-12-02 RX ORDER — ACETAMINOPHEN 500 MG
1000 TABLET ORAL ONCE
Status: COMPLETED | OUTPATIENT
Start: 2019-12-02 | End: 2019-12-02

## 2019-12-02 RX ORDER — LIDOCAINE HYDROCHLORIDE 20 MG/ML
INJECTION, SOLUTION EPIDURAL; INFILTRATION; INTRACAUDAL; PERINEURAL
Status: COMPLETED | OUTPATIENT
Start: 2019-12-02 | End: 2019-12-02

## 2019-12-02 RX ORDER — BUPIVACAINE HYDROCHLORIDE 5 MG/ML
INJECTION, SOLUTION EPIDURAL; INTRACAUDAL
Status: COMPLETED | OUTPATIENT
Start: 2019-12-02 | End: 2019-12-02

## 2019-12-02 RX ADMIN — LIDOCAINE HYDROCHLORIDE 10 ML: 20 INJECTION, SOLUTION EPIDURAL; INFILTRATION; INTRACAUDAL; PERINEURAL at 11:53

## 2019-12-02 RX ADMIN — MIDAZOLAM HYDROCHLORIDE 2 MG: 1 INJECTION, SOLUTION INTRAMUSCULAR; INTRAVENOUS at 11:45

## 2019-12-02 RX ADMIN — SODIUM CHLORIDE, POTASSIUM CHLORIDE, SODIUM LACTATE AND CALCIUM CHLORIDE 1000 ML: 600; 310; 30; 20 INJECTION, SOLUTION INTRAVENOUS at 11:16

## 2019-12-02 RX ADMIN — LIDOCAINE HYDROCHLORIDE 60 MG: 20 INJECTION, SOLUTION INTRAVENOUS at 12:09

## 2019-12-02 RX ADMIN — CEFAZOLIN SODIUM 2 G: 2 SOLUTION INTRAVENOUS at 12:09

## 2019-12-02 RX ADMIN — SODIUM CHLORIDE, POTASSIUM CHLORIDE, SODIUM LACTATE AND CALCIUM CHLORIDE: 600; 310; 30; 20 INJECTION, SOLUTION INTRAVENOUS at 12:45

## 2019-12-02 RX ADMIN — PROPOFOL 120 MCG/KG/MIN: 10 INJECTION, EMULSION INTRAVENOUS at 12:09

## 2019-12-02 RX ADMIN — ACETAMINOPHEN 1000 MG: 500 TABLET, FILM COATED ORAL at 13:54

## 2019-12-02 RX ADMIN — BUPIVACAINE HYDROCHLORIDE 20 ML: 5 INJECTION, SOLUTION EPIDURAL; INTRACAUDAL; PERINEURAL at 11:53

## 2019-12-02 RX ADMIN — ONDANSETRON 4 MG: 2 INJECTION INTRAMUSCULAR; INTRAVENOUS at 12:09

## 2019-12-02 NOTE — ANESTHESIA POSTPROCEDURE EVALUATION
Patient: Sharda Broderick    Procedure Summary     Date:  12/02/19 Room / Location:  [facility] OR 3 /  [facility]    Anesthesia Start:  1209 Anesthesia Stop:  1322    Procedure:  ULNA OPEN REDUCTION INTERNAL FIXATION (SYNTHES) (Left Hand) Diagnosis:       Closed fracture of distal end of left ulna, unspecified fracture morphology, initial encounter      (Closed fracture of distal end of left ulna, unspecified fracture morphology, initial encounter [S52.692A])    Surgeon:  Trey Macdonald MD Provider:  William Hagan CRNA    Anesthesia Type:  MAC, regional ASA Status:  2          Anesthesia Type: MAC, regional  Last vitals  BP   120/66 (12/02/19 1355)   Temp   97.3 °F (36.3 °C) (12/02/19 1059)   Pulse   72 (12/02/19 1355)   Resp   16 (12/02/19 1355)     SpO2   95 % (12/02/19 1355)     Post Anesthesia Care and Evaluation    Patient location during evaluation: bedside  Patient participation: complete - patient participated  Level of consciousness: awake  Pain score: 0  Pain management: adequate  Airway patency: patent  Anesthetic complications: No anesthetic complications  PONV Status: controlled  Cardiovascular status: acceptable and stable  Respiratory status: acceptable and room air  Hydration status: acceptable

## 2019-12-02 NOTE — DISCHARGE INSTRUCTIONS
Please follow all post op instructions and follow up appointment time from your physician's office included in your discharge packet.  .   Keep the affected extremity elevated above  level of the heart.  Use your ice pack as instructed, do not use continuously.  Use your crutches and or sling as directed  Follow your physicians instructions as previously directed.    No pushing, pulling, tugging,  heavy lifting, or strenuous activity.  No major decision making, driving, or drinking alcoholic beverages for 24 hours. ( due to the medications you have  received)  Always use good hand hygiene/washing techniques.  NO driving while taking pain medications.    * if you have an incision:  Check your incision area every day for signs of infection.   Check for:  * more redness, swelling, or pain  *more fluid or blood  *warmth  *pus or bad smell    To assist you in voiding:  Drink plenty of fluids  Listen to running water while attempting to void.  If you are unable to urinate and you have an uncomfortable urge to void or it has been   6 hours since you were discharged, return to the Emergency Room

## 2019-12-02 NOTE — ANESTHESIA PROCEDURE NOTES
Peripheral Block      Patient reassessed immediately prior to procedure    Start time: 12/2/2019 11:46 AM  Stop time: 12/2/2019 11:53 AM  Reason for block: at surgeon's request and post-op pain management  Performed by  CRNA: William Hagan CRNA  Preanesthetic Checklist  Completed: patient identified, site marked, surgical consent, pre-op evaluation, timeout performed, IV checked, risks and benefits discussed and monitors and equipment checked  Prep:  Pt Position: supine  Sterile barriers:cap, gloves, mask and sterile barriers  Prep: ChloraPrep  Patient monitoring: blood pressure monitoring, continuous pulse oximetry and EKG  Procedure  Sedation:yes    Guidance:ultrasound guided  ULTRASOUND INTERPRETATION. Using ultrasound guidance a gauge needle was placed in close proximity to the brachial plexus nerve, at which point, under ultrasound guidance anesthetic was injected in the area of the nerve and spread of the anesthesia was seen on ultrasound in close proximity thereto.  There were no abnormalities seen on ultrasound; a digital image was taken; and the patient tolerated the procedure with no complications. Images:still images obtained    Laterality:left  Block Type:infraclavicular    Needle Type:echogenic  Needle Gauge:21 G  Resistance on Injection: none    Medications Used: lidocaine PF (XYLOCAINE) injection 2 %, 10 mL  bupivacaine PF (MARCAINE) injection 0.5%, 20 mL      Medications  Comment:Adjuncts per total volume of LA:    [id number] PSF      If required, intravenous sedation was given -- see meds on anesthesia record.    Post Assessment  Injection Assessment: negative aspiration for heme, no paresthesia on injection and incremental injection  Patient Tolerance:comfortable throughout block  Complications:no  Additional Notes        ++++++++++++++++++++++++++++++++++     Procedure:               SINGLE SHOT INFRACLAVICULAR                                       Patient analgesia was achieved with  IV Sedation( see meds)       The pt was placed in semi-fowlers position with a slight tilt of the thorax contralateral to the insertion site.  The Insertion Site was prepped and draped in sterile fashion.  The skin was anesthetized with Lidocaine 1% 1ml injection utilizing a 25g needle.  Utilizing ultrasound guidance, a BBraun 20 ga echogenic needle was advanced in-plane.  Major vessels (carotid and Internal Jugular) were visualized as the brachial plexus was approached anterior.  The Lateral and Medial cords were identified.  The needle tip was placed posterior to the subclavian artery and Local anesthesia was injected incrementally every 5 mls with aspiration. Injection pressure was normal or little; there was no intraneural injection, no vascular injection.

## 2019-12-02 NOTE — OP NOTE
[address],  [address]  [place]  [address]  [phone]      OPERATIVE REPORT      PATIENT NAME:  Sharda Broderick                            YOB: 1960       PREOP DIAGNOSIS:   Left forearm distal one third closed ulna shaft transverse slightly comminuted and shortened fracture.    POSTOP DIAGNOSIS:   Same.    PROCEDURE:    Open reduction and internal fixation of Left ulna shaft  fracture.    SURGEON:     Trey cervantes MD    OPERATIVE TEAM:   Circulator: Dejuan Mcintyre RN  Scrub Person: Zana Larson; Rayray Cedeno    ANESTHETIST:  CRNA: William Hagan CRNA    ANESTHESIA:  Regional with monitored anesthesia care    ESTIM BLOOD LOSS:   0 ml    FINDINGS:   Comminuted displaced transverse shortened and rotated unstable distal closed ulna shaft fracture.    SPECIMENS:    None.    IMPLANTS:     Synthes 3.5 mm LC-DCP plate and screws.    TOURNIQUET:    47 min    COMPLICATIONS:    None.    DISPOSITION:    Stable to recovery.    INDICATIONS:    Displaced, shortened, rotated and unstable ulna shaft  fracture.    NARRATIVE:    The patient sustained a traumatic horse kick to the left arm with clinical exam and imaging revealing markedly displaced and unstable pattern fractures of the ulna shaft.  The option of surgical reduction and stabilization for the unstable fracture was reviewed together with the patient in the holding area .  Risks, benefits and alternatives discussed and informed consent for surgical procedure obtained. Risks discussed including but not limited to anesthesia, infection, fracture malunion, nonunion, blood loss, nerve/vessel/tendon injury, hardware issues or pain, post-traumatic arthritis, and persistent pain or limitations from the injury condition.  Goals include potential earlier pain relief, improved fracture position, alignment and healing potential, improved arm and hand mobility and function.  The patient presents  for planned surgery.    Antibiotic prophylaxis was given.  Surgeon site marking and a time out were performed prior to the procedure.  Anesthesia was effective and well tolerated.  Regional nerve block was administered in the holding area by the anesthesia department prior to surgery.  A tourniquet was used and there was good hemostasis throughout the procedure.  The arm and hand was prepped and draped in the usual sterile fashion.  After sterile prep and drape, Esmarch bandage was used for exsanguination in the upper arm tourniquet was inflated to 250 mmHg, an incision was made for an appropriate interval approach the ulna fracture at the interval between the extensor carpi ulnaris and flexor carpi ulnaris tendon sheath taking care to keep the incision dorsal to the small abrasion which is very superficial and did not appear erythematous at its periphery..     Subperiosteal dissection well-exposed the fracture and an anatomic reduction held with bone holding clamps.  Using the Synthes small fragment limited contact locking plate system, the fracture was well stabilized utilizing the LC-DCP locking plate.  A cortical screw on each side of the fracture was placed in fracture compression mode.  Hybrid cortical and locking screws were used to optimize fracture stabilization.  A very secure stable fixation was achieved.  Final C-arm images were saved with a good reduction of the fracture and  and good position of the hardware.  Small pieces of comminuted ulnar bone were gently placed slightly volar and radial on the volar side of the fracture site at the conclusion of the procedure after standard irrigation of the wound.  The image intensifier was used to confirm satisfactory reduction of the distal radial ulnar joint and stability was achieved through wrist full range of motion confirmed with C arm.    The area was irrigated copiously with antibiotic solution and suctioned clear.  There was excellent hemostasis.   Routine closure performed and a sterile padded dressing and a long-arm posterior fiberglass splint was applied.  Anesthesia was effective and well tolerated.  There were no complications of surgery.  The patient was transferred in stable condition to recovery.

## 2019-12-02 NOTE — ANESTHESIA PREPROCEDURE EVALUATION
Anesthesia Evaluation     Patient summary reviewed and Nursing notes reviewed   no history of anesthetic complications:  NPO Solid Status: > 8 hours  NPO Liquid Status: > 8 hours           Airway   Mallampati: I  TM distance: >3 FB  Neck ROM: full  no difficulty expected  Dental - normal exam     Pulmonary - negative pulmonary ROS and normal exam   Cardiovascular - negative cardio ROS and normal exam        Neuro/Psych- negative ROS  GI/Hepatic/Renal/Endo - negative ROS     Musculoskeletal (-) negative ROS    Abdominal    Substance History - negative use     OB/GYN negative ob/gyn ROS         Other - negative ROS                       Anesthesia Plan    ASA 2     MAC and regional     intravenous induction     Anesthetic plan, all risks, benefits, and alternatives have been provided, discussed and informed consent has been obtained with: patient.

## 2019-12-10 ENCOUNTER — OFFICE VISIT (OUTPATIENT)
Dept: ORTHOPEDIC SURGERY | Facility: CLINIC | Age: 59
End: 2019-12-10

## 2019-12-10 VITALS — BODY MASS INDEX: 21.77 KG/M2 | HEIGHT: 69 IN | RESPIRATION RATE: 18 BRPM | WEIGHT: 147 LBS

## 2019-12-10 DIAGNOSIS — S52.602D CLOSED FRACTURE OF DISTAL END OF LEFT ULNA WITH ROUTINE HEALING, UNSPECIFIED FRACTURE MORPHOLOGY, SUBSEQUENT ENCOUNTER: Primary | ICD-10-CM

## 2019-12-10 PROCEDURE — 99024 POSTOP FOLLOW-UP VISIT: CPT | Performed by: ORTHOPAEDIC SURGERY

## 2019-12-10 NOTE — PROGRESS NOTES
Subjective   Patient ID: Sharda Broderick is a 59 y.o. female  Post-op and Pain of the Left Wrist (12/02/19 Open reduction and internal fixation of Left ulna shaft  Fracture. Patient is here today for a dressing change.)             History of Present Illness  She presents for dressing change status post ORIF distal ulna shaft fracture, tried to catch a falling ornament off the tree yesterday with a left arm with a little bit of pain in the wrist area denies numbness or tingling very comfortable otherwise no complaints of elbow pain.      Review of Systems   Constitutional: Negative for fever.   HENT: Negative for dental problem and voice change.    Eyes: Negative for visual disturbance.   Respiratory: Negative for shortness of breath.    Cardiovascular: Negative for chest pain.   Gastrointestinal: Negative for abdominal pain.   Genitourinary: Negative for dysuria.   Musculoskeletal: Positive for arthralgias. Negative for gait problem and joint swelling.   Skin: Negative for rash.   Neurological: Negative for speech difficulty.   Hematological: Does not bruise/bleed easily.   Psychiatric/Behavioral: Negative for confusion.       Past Medical History:   Diagnosis Date   • Headache    • Left wrist injury     horse kicked during feeding   • Migraines    • Shoulder fracture, left     at age 12 from falling off horse   • Tinnitus    • Wears dentures     upper only    • Wears glasses         Past Surgical History:   Procedure Laterality Date   • HYSTERECTOMY      complete   • ORIF ULNA/RADIUS FRACTURES Left 12/2/2019    Procedure: ULNA OPEN REDUCTION INTERNAL FIXATION (SYNTHES);  Surgeon: Trey Macdonald MD;  Location: [facility];  Service: Orthopedics   • TOOTH EXTRACTION         Family History   Problem Relation Age of Onset   • Hypertension Mother    • Heart disease Mother    • COPD Mother    • No Known Problems Brother    • Heart disease Brother    • COPD Brother        Social History     Socioeconomic History   •  "Marital status:      Spouse name: Not on file   • Number of children: Not on file   • Years of education: Not on file   • Highest education level: Not on file   Tobacco Use   • Smoking status: Former Smoker     Packs/day: 0.25     Types: Cigarettes     Last attempt to quit: 2011     Years since quittin.9   • Smokeless tobacco: Never Used   Substance and Sexual Activity   • Alcohol use: Yes     Comment: rarely   • Drug use: No   • Sexual activity: Defer     Partners: Male     Birth control/protection: Surgical       I have reviewed the above medical and surgical history, family history, social history, medications, allergies and review of systems.    No Known Allergies      Current Outpatient Medications:   •  estradiol (ESTRACE) 0.5 MG tablet, TAKE ONE TABLET BY MOUTH DAILY, Disp: 90 tablet, Rfl: 0  •  ibuprofen (ADVIL,MOTRIN) 200 MG tablet, Take 200 mg by mouth Every 6 (Six) Hours As Needed for Mild Pain ., Disp: , Rfl:   •  oxyCODONE-acetaminophen (PERCOCET) 5-325 MG per tablet, Take 1 tablet by mouth Every 6 (Six) Hours As Needed for pain, Disp: 15 tablet, Rfl: 0  •  SUMAtriptan (IMITREX) 100 MG tablet, Take 1 tablet by mouth Every 2 (Two) Hours As Needed for Migraine., Disp: 9 tablet, Rfl: 5  •  topiramate (TOPAMAX) 25 MG tablet, Take 1 tablet by mouth Daily., Disp: 90 tablet, Rfl: 2    Objective   Resp 18   Ht 175.3 cm (69\")   Wt 66.7 kg (147 lb)   BMI 21.71 kg/m²    Physical Exam  Constitutional: Patient is oriented to person, place, and time. Patient appears well-developed and well-nourished.   HENT:Head: Normocephalic and atraumatic.   Eyes: EOM are normal. Pupils are equal, round, and reactive to light.   Neck: Normal range of motion. Neck supple.   Cardiovascular: Normal rate.    Pulmonary/Chest: Effort normal and breath sounds normal.   Abdominal: Soft.   Neurological: Patient is alert and oriented to person, place, and time.   Skin: Skin is warm and dry.   Psychiatric: Patient has a normal " mood and affect.   Nursing note and vitals reviewed.       [unfilled]   Left wrist: Incision well approximated no erythema no drainage 2 areas of abrasions at the ulnar side of her distal third of the forearm appear to be healing without significant erythema sensation intact throughout no pain with finger range of motion.    Assessment/Plan   Review of Radiographic Studies:    No new imaging done today.      Procedures     Dressing change the left wrist and reapplication of her postoperative long-arm fiberglass splint that was fit to her arm in the operating room.     Sharda was seen today for post-op and pain.    Diagnoses and all orders for this visit:    Closed fracture of distal end of left ulna with routine healing, unspecified fracture morphology, subsequent encounter       Use brace as instructed      Recommendations/Plan:   Work/Activity Status: No use of involved extremity    Patient agreeable to call or return sooner for any concerns.             Impression:  Status post left nondominant wrist distal ulnar shaft ORIF  Plan:  Return next week for suture removal short arm casting x-rays left forearm in new short arm cast

## 2019-12-17 ENCOUNTER — OFFICE VISIT (OUTPATIENT)
Dept: ORTHOPEDIC SURGERY | Facility: CLINIC | Age: 59
End: 2019-12-17

## 2019-12-17 VITALS — WEIGHT: 147 LBS | HEIGHT: 69 IN | BODY MASS INDEX: 21.77 KG/M2 | RESPIRATION RATE: 18 BRPM

## 2019-12-17 DIAGNOSIS — S52.602D CLOSED FRACTURE OF DISTAL END OF LEFT ULNA WITH ROUTINE HEALING, UNSPECIFIED FRACTURE MORPHOLOGY, SUBSEQUENT ENCOUNTER: Primary | ICD-10-CM

## 2019-12-17 PROCEDURE — 99024 POSTOP FOLLOW-UP VISIT: CPT | Performed by: ORTHOPAEDIC SURGERY

## 2019-12-17 PROCEDURE — 29075 APPL CST ELBW FNGR SHORT ARM: CPT | Performed by: ORTHOPAEDIC SURGERY

## 2019-12-17 NOTE — PROGRESS NOTES
Subjective   Patient ID: Sharda Broderick is a 59 y.o. female  Post-op and Pain of the Left Wrist (12/02/19 Open reduction and internal fixation of Left ulna shaft  Fracture. Patient is here today for suture removal and cast application.)             History of Present Illness  She returns for suture removal left wrist very comfortable in her splint compliant with instructions no new falls or injuries loss of feeling or significant wrist pain at this time.      Review of Systems   Constitutional: Negative for fever.   HENT: Negative for dental problem and voice change.    Eyes: Negative for visual disturbance.   Respiratory: Negative for shortness of breath.    Cardiovascular: Negative for chest pain.   Gastrointestinal: Negative for abdominal pain.   Genitourinary: Negative for dysuria.   Musculoskeletal: Positive for arthralgias. Negative for gait problem and joint swelling.   Skin: Negative for rash.   Neurological: Negative for speech difficulty.   Hematological: Does not bruise/bleed easily.   Psychiatric/Behavioral: Negative for confusion.       Past Medical History:   Diagnosis Date   • Headache    • Left wrist injury     horse kicked during feeding   • Migraines    • Shoulder fracture, left     at age 12 from falling off horse   • Tinnitus    • Wears dentures     upper only    • Wears glasses         Past Surgical History:   Procedure Laterality Date   • HYSTERECTOMY      complete   • ORIF ULNA/RADIUS FRACTURES Left 12/2/2019    Procedure: ULNA OPEN REDUCTION INTERNAL FIXATION (SYNTHES);  Surgeon: Trey Macdonald MD;  Location: [facility];  Service: Orthopedics   • TOOTH EXTRACTION         Family History   Problem Relation Age of Onset   • Hypertension Mother    • Heart disease Mother    • COPD Mother    • No Known Problems Brother    • Heart disease Brother    • COPD Brother        Social History     Socioeconomic History   • Marital status:      Spouse name: Not on file   • Number of children:  "Not on file   • Years of education: Not on file   • Highest education level: Not on file   Tobacco Use   • Smoking status: Former Smoker     Packs/day: 0.25     Types: Cigarettes     Last attempt to quit: 2011     Years since quittin.9   • Smokeless tobacco: Never Used   Substance and Sexual Activity   • Alcohol use: Yes     Comment: rarely   • Drug use: No   • Sexual activity: Defer     Partners: Male     Birth control/protection: Surgical       I have reviewed the above medical and surgical history, family history, social history, medications, allergies and review of systems.    No Known Allergies      Current Outpatient Medications:   •  estradiol (ESTRACE) 0.5 MG tablet, TAKE ONE TABLET BY MOUTH DAILY, Disp: 90 tablet, Rfl: 0  •  ibuprofen (ADVIL,MOTRIN) 200 MG tablet, Take 200 mg by mouth Every 6 (Six) Hours As Needed for Mild Pain ., Disp: , Rfl:   •  oxyCODONE-acetaminophen (PERCOCET) 5-325 MG per tablet, Take 1 tablet by mouth Every 6 (Six) Hours As Needed for pain, Disp: 15 tablet, Rfl: 0  •  SUMAtriptan (IMITREX) 100 MG tablet, Take 1 tablet by mouth Every 2 (Two) Hours As Needed for Migraine., Disp: 9 tablet, Rfl: 5  •  topiramate (TOPAMAX) 25 MG tablet, Take 1 tablet by mouth Daily., Disp: 90 tablet, Rfl: 2    Objective   Resp 18   Ht 175.3 cm (69\")   Wt 66.7 kg (147 lb)   BMI 21.71 kg/m²    Physical Exam  Constitutional: Patient is oriented to person, place, and time. Patient appears well-developed and well-nourished.   HENT:Head: Normocephalic and atraumatic.   Eyes: EOM are normal. Pupils are equal, round, and reactive to light.   Neck: Normal range of motion. Neck supple.   Cardiovascular: Normal rate.    Pulmonary/Chest: Effort normal and breath sounds normal.   Abdominal: Soft.   Neurological: Patient is alert and oriented to person, place, and time.   Skin: Skin is warm and dry.   Psychiatric: Patient has a normal mood and affect.   Nursing note and vitals reviewed.       [unfilled]   Left " wrist: Surgical incision well-healed sutures removed Steri-Strips applied new short arm fiberglass cast applied.    Assessment/Plan   Review of Radiographic Studies:    Radiographic images today of fracture I personally viewed and confirm satisfactory alignment.      Left short arm fiberglass cast application by Mishel Willingham  Date/Time: 12/17/2019 10:54 AM  Performed by: Trey Macdonald MD  Authorized by: Trey Macdonald MD   Consent: Verbal consent obtained.  Risks and benefits: risks, benefits and alternatives were discussed  Consent given by: patient  Patient understanding: patient states understanding of the procedure being performed  Patient identity confirmed: verbally with patient  Location details: left arm  Splint type: volar short arm  Supplies used: Ortho-Glass  Post-procedure: The splinted body part was neurovascularly unchanged following the procedure.  Patient tolerance: Patient tolerated the procedure well with no immediate complications           Sharda was seen today for post-op and pain.    Diagnoses and all orders for this visit:    Closed fracture of distal end of left ulna with routine healing, unspecified fracture morphology, subsequent encounter    Other orders  -     Splint Application       Orthopedic activities reviewed and patient expressed appreciation      Recommendations/Plan:   Work/Activity Status: No use of involved extremity    Patient agreeable to call or return sooner for any concerns.             Impression:  Status post left distal ulna ORIF  Plan:  Return in 4 to 5 weeks for cast removal

## 2020-01-13 DIAGNOSIS — S52.602D CLOSED FRACTURE OF DISTAL END OF LEFT ULNA WITH ROUTINE HEALING, UNSPECIFIED FRACTURE MORPHOLOGY, SUBSEQUENT ENCOUNTER: Primary | ICD-10-CM

## 2020-01-14 ENCOUNTER — OFFICE VISIT (OUTPATIENT)
Dept: ORTHOPEDIC SURGERY | Facility: CLINIC | Age: 60
End: 2020-01-14

## 2020-01-14 VITALS — RESPIRATION RATE: 18 BRPM | BODY MASS INDEX: 21.77 KG/M2 | WEIGHT: 147 LBS | HEIGHT: 69 IN

## 2020-01-14 DIAGNOSIS — S52.602D CLOSED FRACTURE OF DISTAL END OF LEFT ULNA WITH ROUTINE HEALING, UNSPECIFIED FRACTURE MORPHOLOGY, SUBSEQUENT ENCOUNTER: Primary | ICD-10-CM

## 2020-01-14 DIAGNOSIS — M19.032 PRIMARY OSTEOARTHRITIS OF LEFT WRIST: ICD-10-CM

## 2020-01-14 PROCEDURE — 99024 POSTOP FOLLOW-UP VISIT: CPT | Performed by: ORTHOPAEDIC SURGERY

## 2020-01-14 NOTE — PROGRESS NOTES
Subjective   Patient ID: Sharda Broderick is a 59 y.o. female  Follow-up and Pain of the Left Wrist (Patient is here today for cast removal ,she states her arm is stiff and achy.)             History of Present Illness  She returns for cast removal of her left wrist is doing fairly well with it did fall and tripped and hit it recently had some swelling and pain there otherwise swelling and tenderness localized to the left forearm.      Review of Systems   Constitutional: Negative for fever.   HENT: Negative for dental problem and voice change.    Eyes: Negative for visual disturbance.   Respiratory: Negative for shortness of breath.    Cardiovascular: Negative for chest pain.   Gastrointestinal: Negative for abdominal pain.   Genitourinary: Negative for dysuria.   Musculoskeletal: Positive for arthralgias. Negative for gait problem and joint swelling.   Skin: Negative for rash.   Neurological: Negative for speech difficulty.   Hematological: Does not bruise/bleed easily.   Psychiatric/Behavioral: Negative for confusion.       Past Medical History:   Diagnosis Date   • Headache    • Left wrist injury     horse kicked during feeding   • Migraines    • Shoulder fracture, left     at age 12 from falling off horse   • Tinnitus    • Wears dentures     upper only    • Wears glasses         Past Surgical History:   Procedure Laterality Date   • HYSTERECTOMY      complete   • ORIF ULNA/RADIUS FRACTURES Left 12/2/2019    Procedure: ULNA OPEN REDUCTION INTERNAL FIXATION (SYNTHES);  Surgeon: Trey Macdonald MD;  Location: [facility];  Service: Orthopedics   • TOOTH EXTRACTION         Family History   Problem Relation Age of Onset   • Hypertension Mother    • Heart disease Mother    • COPD Mother    • No Known Problems Brother    • Heart disease Brother    • COPD Brother        Social History     Socioeconomic History   • Marital status:      Spouse name: Not on file   • Number of children: Not on file   • Years of  "education: Not on file   • Highest education level: Not on file   Tobacco Use   • Smoking status: Former Smoker     Packs/day: 0.25     Types: Cigarettes     Last attempt to quit: 2011     Years since quittin.0   • Smokeless tobacco: Never Used   Substance and Sexual Activity   • Alcohol use: Yes     Comment: rarely   • Drug use: No   • Sexual activity: Defer     Partners: Male     Birth control/protection: Surgical       I have reviewed the above medical and surgical history, family history, social history, medications, allergies and review of systems.    No Known Allergies      Current Outpatient Medications:   •  estradiol (ESTRACE) 0.5 MG tablet, TAKE ONE TABLET BY MOUTH DAILY, Disp: 90 tablet, Rfl: 0  •  ibuprofen (ADVIL,MOTRIN) 200 MG tablet, Take 200 mg by mouth Every 6 (Six) Hours As Needed for Mild Pain ., Disp: , Rfl:   •  oxyCODONE-acetaminophen (PERCOCET) 5-325 MG per tablet, Take 1 tablet by mouth Every 6 (Six) Hours As Needed for pain, Disp: 15 tablet, Rfl: 0  •  SUMAtriptan (IMITREX) 100 MG tablet, Take 1 tablet by mouth Every 2 (Two) Hours As Needed for Migraine., Disp: 9 tablet, Rfl: 5  •  topiramate (TOPAMAX) 25 MG tablet, Take 1 tablet by mouth Daily., Disp: 90 tablet, Rfl: 2    Objective   Resp 18   Ht 175.3 cm (69\")   Wt 66.7 kg (147 lb)   BMI 21.71 kg/m²    Physical Exam  Constitutional: Patient is oriented to person, place, and time. Patient appears well-developed and well-nourished.   HENT:Head: Normocephalic and atraumatic.   Eyes: EOM are normal. Pupils are equal, round, and reactive to light.   Neck: Normal range of motion. Neck supple.   Cardiovascular: Normal rate.    Pulmonary/Chest: Effort normal and breath sounds normal.   Abdominal: Soft.   Neurological: Patient is alert and oriented to person, place, and time.   Skin: Skin is warm and dry.   Psychiatric: Patient has a normal mood and affect.   Nursing note and vitals reviewed.       [unfilled]   Left arm: Cast was removed " incisions well-healed slight tenderness of the DRUJ minimal erythema atrophy in the forearm consistent with casting, slight swelling in the proximal forearm near the olecranon region but no erythema full elbow extension no tenderness at the lateral or medial condylar regions.  Fingertips grossly neurovascularly intact    Assessment/Plan   Review of Radiographic Studies:    Indication to evaluate fracture healing, and compared with prior imaging, shows interm fracture healing, callus formation and or periostitis in continued good position and alignment.      Procedures     Sharda was seen today for follow-up and pain.    Diagnoses and all orders for this visit:    Closed fracture of distal end of left ulna with routine healing, unspecified fracture morphology, subsequent encounter    Primary osteoarthritis of left wrist       Physical therapy referral given and Use brace as instructed      Recommendations/Plan:   Referral: PT/OT 2-3 x wk x 4-6 wks    Patient agreeable to call or return sooner for any concerns.       Discussed and reviewed activity modifications during the postop recovery.      Impression:  Healing left distal ulna shaft fracture ORIF with plate, chronic DRUJ osteoarthritis  Plan:  Begin gentle mobilization, avoid heavy lifting follow-up in 6 weeks x-rays left wrist at that time to progress to weight lifting as tolerated with her horses

## 2020-02-18 ENCOUNTER — HOSPITAL ENCOUNTER (OUTPATIENT)
Dept: PHYSICAL THERAPY | Facility: HOSPITAL | Age: 60
Setting detail: THERAPIES SERIES
Discharge: HOME | End: 2020-02-18
Payer: COMMERCIAL

## 2020-02-18 DIAGNOSIS — M25.532 ARTHRALGIA OF WRIST, LEFT: ICD-10-CM

## 2020-02-18 DIAGNOSIS — M19.032 PRIMARY OSTEOARTHRITIS OF LEFT WRIST: ICD-10-CM

## 2020-02-18 DIAGNOSIS — S52.602D CLOSED FRACTURE OF DISTAL END OF LEFT ULNA WITH ROUTINE HEALING, UNSPECIFIED FRACTURE MORPHOLOGY, SUBSEQUENT ENCOUNTER: Primary | ICD-10-CM

## 2020-02-18 PROCEDURE — 97161 PT EVAL LOW COMPLEX 20 MIN: CPT

## 2020-02-18 PROCEDURE — 97110 THERAPEUTIC EXERCISES: CPT

## 2020-02-18 ASSESSMENT — PAIN DESCRIPTION - LOCATION: LOCATION: SHOULDER;WRIST

## 2020-02-18 ASSESSMENT — PAIN DESCRIPTION - ORIENTATION: ORIENTATION: LEFT

## 2020-02-18 ASSESSMENT — PAIN DESCRIPTION - FREQUENCY: FREQUENCY: INTERMITTENT

## 2020-02-18 ASSESSMENT — PAIN SCALES - GENERAL: PAINLEVEL_OUTOF10: 2

## 2020-02-20 ASSESSMENT — PAIN DESCRIPTION - FREQUENCY: FREQUENCY: INTERMITTENT

## 2020-02-20 ASSESSMENT — PAIN DESCRIPTION - LOCATION: LOCATION: SHOULDER;WRIST

## 2020-02-20 ASSESSMENT — PAIN DESCRIPTION - ORIENTATION: ORIENTATION: LEFT

## 2020-02-20 ASSESSMENT — PAIN SCALES - GENERAL: PAINLEVEL_OUTOF10: 2

## 2020-02-20 NOTE — PROGRESS NOTES
Physical Therapy  Initial Assessment  Date: 2020  Patient Name: Shella Gottron  MRN: [id number]  : 1960     Treatment Diagnosis: s/p L ulna ORIF, L shoulder adhesive capsulitis    Subjective   General  Chart Reviewed: Yes  Patient assessed for rehabilitation services?: Yes  Referring Practitioner: Alexsander Reilly MD  Referral Date : 20  Diagnosis: closed fracture of distal end of L ulna  PT Visit Information  PT Insurance Information: BCBS    Subjective: Pt reports suffering a L ulna fracture on 19 when she was kicked by her horse. Pt underwent ORIF on approx 19. Pt was placed in a cast on 19 and was in a cast for approx one month. Pt has been wearing a wrist splint during work hours. Pt states at her last follow up her surgeon reported she still had some healing to do and to not lift with her L UE. Pt reports having stiffness in her wrist and hand, she still can't make a full fist.  Pt also states that her L shoulder has bothered her since being in the cast and now has limited ROM and pain with movement of her L shoulder. Pain Screening  Patient Currently in Pain: Yes  Pain Assessment  Pain Assessment: 0-10  Pain Level: 2(2-3/10 average wrist pain during the day. Shoulder pain can be 8/10 with movement, sudden sharp pain. Pt cannot sleep on L shoulder)  Pain Location: Shoulder;Wrist  Pain Orientation: Left  Pain Frequency: Intermittent  Vital Signs  Patient Currently in Pain: Yes    Orientation  Orientation  Overall Orientation Status: Within Normal Limits    Social/Functional History  Social/Functional History  Occupation: Part time employment  Type of occupation: works 3 days a week as a     Objective     Observation/Palpation  Observation: sugical incision well healed    AROM LUE (degrees)  LUE AROM : Exceptions  L Shoulder Flexion 0-180: 0-104  L Shoulder ABduction 0-180: 0-68  L Shoulder Ext Rotation  0-90: 0-37 deg at 0 deg abd.   L Elbow Flexion 0-145: 140  L Elbow Extension 145-0: lacks 15 deg to full ext  L Forearm Pron 0-90: 0-72  L Forearm Supination  0-90: 0-15  L Wrist Flexion 0-80: 0-24  L Wrist Extension 0-70: 0-43  L Wrist Radial Deviation 0-20: 0-12  L Wrist Ulnar Deviation 0-45: 0-12  Left Hand AROM (degrees)  Left Hand AROM: Exceptions  Left Hand General AROM: cannot make full fist, PIP flexion limited to approx 90 deg grossly    Strength LUE  Strength LUE: Exception  L Shoulder Flexion: 4+/5  L Shoulder ABduction: 4+/5(in available ROM)  L Elbow Flexion: 4+/5  L Elbow Extension: 4+/5  L Wrist Flexion: 4/5  L Wrist Extension: 4/5  L Wrist Radial Deviation: 4/5  L Wrist Ulnar Deviation: 4/5     Additional Measures  Special Tests: Quick DASH: 61%        Exercises  Exercise 1: Tendon glides x 30  Exercise 2: Finger oppostion (full ext between) x30  Exercise 3: Wrist flex / ext stretch 5x20\"  Exercise 4: Wrist prayer stretch 5x20\"  Exercise 5: Pron / sup AROM x30  Exercise 6: RD / UD AROM  Exercise 7: Elbow AROM (flex / ext) focus on full ext x30  Exercise 8: Pendulums: 30x cw/ccw  Exercise 9: Table dust shoulder flexion x30  Exercise 10: Wand abd - shoulder 3x10  Exercise 11: Wand ER / IR - shoulder 3x10   Pt was educated in and issued a written HEP of the above exercises. Assessment   Conditions Requiring Skilled Therapeutic Intervention  Assessment: Pt will benefit from skilled PT to address deficits due to s/p L wrist ORIF to restore optimal function. Pt also exhibits signs and symptoms consistent with L shoulder adhesive capsulitis and will benefit from PT to address L shoulder deficits.    Treatment Diagnosis: s/p L ulna ORIF, L shoulder adhesive capsulitis  Prognosis: Good  Decision Making: Low Complexity  REQUIRES PT FOLLOW UP: Yes  Activity Tolerance  Activity Tolerance: Patient Tolerated treatment well         Plan   Plan  Times per week: 2x/week  Plan weeks: 8-10 weeks   Current Treatment Recommendations: Strengthening, Manual

## 2020-02-21 ENCOUNTER — HOSPITAL ENCOUNTER (OUTPATIENT)
Dept: PHYSICAL THERAPY | Facility: HOSPITAL | Age: 60
Setting detail: THERAPIES SERIES
Discharge: HOME | End: 2020-02-21
Payer: COMMERCIAL

## 2020-02-21 PROCEDURE — 97140 MANUAL THERAPY 1/> REGIONS: CPT

## 2020-02-21 PROCEDURE — 97110 THERAPEUTIC EXERCISES: CPT

## 2020-02-21 NOTE — FLOWSHEET NOTE
Hold pending MD visit [] Discharge    Plan for Next Session:        Electronically signed by:  Nehal Hoang PT

## 2020-02-24 DIAGNOSIS — S52.602D CLOSED FRACTURE OF DISTAL END OF LEFT ULNA WITH ROUTINE HEALING, UNSPECIFIED FRACTURE MORPHOLOGY, SUBSEQUENT ENCOUNTER: Primary | ICD-10-CM

## 2020-02-25 ENCOUNTER — OFFICE VISIT (OUTPATIENT)
Dept: ORTHOPEDIC SURGERY | Facility: CLINIC | Age: 60
End: 2020-02-25

## 2020-02-25 VITALS — RESPIRATION RATE: 18 BRPM | HEIGHT: 69 IN | BODY MASS INDEX: 21.77 KG/M2 | WEIGHT: 147 LBS

## 2020-02-25 DIAGNOSIS — M75.02 ADHESIVE CAPSULITIS OF LEFT SHOULDER: ICD-10-CM

## 2020-02-25 DIAGNOSIS — S52.602D CLOSED FRACTURE OF DISTAL END OF LEFT ULNA WITH ROUTINE HEALING, UNSPECIFIED FRACTURE MORPHOLOGY, SUBSEQUENT ENCOUNTER: Primary | ICD-10-CM

## 2020-02-25 DIAGNOSIS — M25.512 PAIN IN JOINT OF LEFT SHOULDER: ICD-10-CM

## 2020-02-25 DIAGNOSIS — M19.032 PRIMARY OSTEOARTHRITIS OF LEFT WRIST: ICD-10-CM

## 2020-02-25 PROCEDURE — 20610 DRAIN/INJ JOINT/BURSA W/O US: CPT | Performed by: ORTHOPAEDIC SURGERY

## 2020-02-25 PROCEDURE — 99213 OFFICE O/P EST LOW 20 MIN: CPT | Performed by: ORTHOPAEDIC SURGERY

## 2020-02-25 RX ORDER — IBUPROFEN 800 MG/1
800 TABLET ORAL 3 TIMES DAILY
Qty: 90 TABLET | Refills: 0 | Status: SHIPPED | OUTPATIENT
Start: 2020-02-25

## 2020-02-25 RX ORDER — LIDOCAINE HYDROCHLORIDE 10 MG/ML
2 INJECTION, SOLUTION INFILTRATION; PERINEURAL
Status: COMPLETED | OUTPATIENT
Start: 2020-02-25 | End: 2020-02-25

## 2020-02-25 RX ORDER — TRIAMCINOLONE ACETONIDE 40 MG/ML
40 INJECTION, SUSPENSION INTRA-ARTICULAR; INTRAMUSCULAR
Status: COMPLETED | OUTPATIENT
Start: 2020-02-25 | End: 2020-02-25

## 2020-02-25 RX ADMIN — TRIAMCINOLONE ACETONIDE 40 MG: 40 INJECTION, SUSPENSION INTRA-ARTICULAR; INTRAMUSCULAR at 11:43

## 2020-02-25 RX ADMIN — LIDOCAINE HYDROCHLORIDE 2 ML: 10 INJECTION, SOLUTION INFILTRATION; PERINEURAL at 11:43

## 2020-02-25 NOTE — PROGRESS NOTES
Subjective   Patient ID: Sahrda Broderick is a 59 y.o. female  Post-op and Pain of the Left Wrist (Patient is here today for a follow up on her left wrist, she states her wrist still keeps her up at night and after therapy it's sore.)             History of Present Illness  She returns status post left distal ulna ORIF doing better with the wrist she is noticed her shoulders become progressively stiff and the therapist think she is developing a frozen shoulder there.  Did online research and does not feel is related to the block that she received during the time of her surgery.  She is not yet working with her horses feeling improvement with range of motion but sore taking ibuprofen just twice daily.  He does recall injuring her left shoulder with a fracture at age 12 never went through therapy is always had some weakness in her left shoulder since a childhood injury.      Review of Systems   Constitutional: Negative for fever.   HENT: Negative for dental problem and voice change.    Eyes: Negative for visual disturbance.   Respiratory: Negative for shortness of breath.    Cardiovascular: Negative for chest pain.   Gastrointestinal: Negative for abdominal pain.   Genitourinary: Negative for dysuria.   Musculoskeletal: Positive for arthralgias. Negative for gait problem and joint swelling.   Skin: Negative for rash.   Neurological: Negative for speech difficulty.   Hematological: Does not bruise/bleed easily.   Psychiatric/Behavioral: Negative for confusion.       Past Medical History:   Diagnosis Date   • Headache    • Left wrist injury     horse kicked during feeding   • Migraines    • Shoulder fracture, left     at age 12 from falling off horse   • Tinnitus    • Wears dentures     upper only    • Wears glasses         Past Surgical History:   Procedure Laterality Date   • HYSTERECTOMY      complete   • ORIF ULNA/RADIUS FRACTURES Left 12/2/2019    Procedure: ULNA OPEN REDUCTION INTERNAL FIXATION (SYNTHES);   "Surgeon: Trey Macdonald MD;  Location: [facility];  Service: Orthopedics   • TOOTH EXTRACTION         Family History   Problem Relation Age of Onset   • Hypertension Mother    • Heart disease Mother    • COPD Mother    • No Known Problems Brother    • Heart disease Brother    • COPD Brother        Social History     Socioeconomic History   • Marital status:      Spouse name: Not on file   • Number of children: Not on file   • Years of education: Not on file   • Highest education level: Not on file   Tobacco Use   • Smoking status: Former Smoker     Packs/day: 0.25     Types: Cigarettes     Last attempt to quit:      Years since quittin.1   • Smokeless tobacco: Never Used   Substance and Sexual Activity   • Alcohol use: Yes     Comment: rarely   • Drug use: No   • Sexual activity: Defer     Partners: Male     Birth control/protection: Surgical       I have reviewed the medical and surgical history, family history, social history, medications, and/or allergies, and the review of systems of this report.    No Known Allergies      Current Outpatient Medications:   •  estradiol (ESTRACE) 0.5 MG tablet, TAKE ONE TABLET BY MOUTH DAILY, Disp: 90 tablet, Rfl: 0  •  ibuprofen (ADVIL,MOTRIN) 800 MG tablet, Take 1 tablet by mouth 3 (Three) Times a Day., Disp: 90 tablet, Rfl: 0  •  oxyCODONE-acetaminophen (PERCOCET) 5-325 MG per tablet, Take 1 tablet by mouth Every 6 (Six) Hours As Needed for pain, Disp: 15 tablet, Rfl: 0  •  SUMAtriptan (IMITREX) 100 MG tablet, Take 1 tablet by mouth Every 2 (Two) Hours As Needed for Migraine., Disp: 9 tablet, Rfl: 5  •  topiramate (TOPAMAX) 25 MG tablet, Take 1 tablet by mouth Daily., Disp: 90 tablet, Rfl: 2    Objective   Resp 18   Ht 175.3 cm (69\")   Wt 66.7 kg (147 lb)   BMI 21.71 kg/m²    Physical Exam  Constitutional: Patient is oriented to person, place, and time. Patient appears well-developed and well-nourished.   HENT:Head: Normocephalic and atraumatic.   Eyes: EOM " are normal. Pupils are equal, round, and reactive to light.   Neck: Normal range of motion. Neck supple.   Cardiovascular: Normal rate.    Pulmonary/Chest: Effort normal and breath sounds normal.   Abdominal: Soft.   Neurological: Patient is alert and oriented to person, place, and time.   Skin: Skin is warm and dry.   Psychiatric: Patient has a normal mood and affect.   Nursing note and vitals reviewed.       [unfilled]   Left shoulder: Forward elevation only 80 degrees actively with pain anteriorly at the left glenohumeral area, active abduction only 70 degrees again with pain at the anterolateral acromial area.    Left wrist: Well-healed incision minimal tenderness distal ulna supination only to 20 degrees pronation about 30 degrees some tenderness the distal radial ulnar joint no tenderness the distal radial styloid or common finger extensors, intrinsic contracture evident in the hand    Assessment/Plan   Review of Radiographic Studies:    Left wrist x-ray confirms healing distal ulnar shaft fracture with well aligned plate screws associated DRUJ arthritis evident images demonstrated to the patient     Left shoulder x-ray confirms evidence of old malunited proximal humeral shaft fracture minimal osteoarthritic change at the glenohumeral joint with no sign of acute fracture or significant soft tissue swelling      Large Joint Arthrocentesis: L subacromial bursa  Date/Time: 2/25/2020 11:43 AM  Consent given by: patient  Site marked: site marked  Timeout: Immediately prior to procedure a time out was called to verify the correct patient, procedure, equipment, support staff and site/side marked as required   Supporting Documentation  Indications: pain   Procedure Details  Location: shoulder - L subacromial bursa  Preparation: Patient was prepped and draped in the usual sterile fashion  Needle size: 22 G  Approach: posterior  Medications administered: 40 mg triamcinolone acetonide 40 MG/ML; 2 mL lidocaine 1  %  Patient tolerance: patient tolerated the procedure well with no immediate complications           Sharda was seen today for post-op and pain.    Diagnoses and all orders for this visit:    Closed fracture of distal end of left ulna with routine healing, unspecified fracture morphology, subsequent encounter    Primary osteoarthritis of left wrist    Pain in joint of left shoulder  -     XR Shoulder 2+ View Left    Adhesive capsulitis of left shoulder    Other orders  -     ibuprofen (ADVIL,MOTRIN) 800 MG tablet; Take 1 tablet by mouth 3 (Three) Times a Day.       Physical therapy referral given      Recommendations/Plan:   Referral: PT/OT 2-3 x wk x 4-6 wks    Patient agreeable to call or return sooner for any concerns.             Impression:  Left shoulder adhesive capsulitis, healing left distal ulnar shaft fracture, left wrist DRUJ chronic arthritis, intrinsic contracture left hand  Plan:  Continue therapy progression activities as tolerated lifting up to 5 to 10 pounds now with the left hand, follow-up in 6 weeks if not improving at left shoulder order MR left shoulder at that time.

## 2020-02-26 ENCOUNTER — APPOINTMENT (OUTPATIENT)
Dept: PHYSICAL THERAPY | Facility: HOSPITAL | Age: 60
End: 2020-02-26
Payer: COMMERCIAL

## 2020-02-28 ENCOUNTER — HOSPITAL ENCOUNTER (OUTPATIENT)
Dept: PHYSICAL THERAPY | Facility: HOSPITAL | Age: 60
Setting detail: THERAPIES SERIES
End: 2020-02-28
Payer: COMMERCIAL

## 2020-03-04 ENCOUNTER — HOSPITAL ENCOUNTER (OUTPATIENT)
Dept: PHYSICAL THERAPY | Facility: HOSPITAL | Age: 60
Setting detail: THERAPIES SERIES
Discharge: HOME | End: 2020-03-04
Payer: COMMERCIAL

## 2020-03-04 PROCEDURE — 97140 MANUAL THERAPY 1/> REGIONS: CPT

## 2020-03-04 PROCEDURE — 97110 THERAPEUTIC EXERCISES: CPT

## 2020-03-06 ENCOUNTER — HOSPITAL ENCOUNTER (OUTPATIENT)
Dept: PHYSICAL THERAPY | Facility: HOSPITAL | Age: 60
Setting detail: THERAPIES SERIES
Discharge: HOME | End: 2020-03-06
Payer: COMMERCIAL

## 2020-03-10 ENCOUNTER — HOSPITAL ENCOUNTER (OUTPATIENT)
Dept: PHYSICAL THERAPY | Facility: HOSPITAL | Age: 60
Setting detail: THERAPIES SERIES
Discharge: HOME | End: 2020-03-10
Payer: COMMERCIAL

## 2020-03-10 PROCEDURE — 97140 MANUAL THERAPY 1/> REGIONS: CPT

## 2020-03-10 PROCEDURE — 97110 THERAPEUTIC EXERCISES: CPT

## 2020-03-10 NOTE — FLOWSHEET NOTE
Physical Therapy Daily Treatment Note   Date:  3/10/2020    TIme In:    1127                  Time Out:     [address]    Patient Name:  Carole Gillis    :  1960  MRN: [id number]    Restrictions/Precautions:    Pertinent Medical History:  Medical/Treatment Diagnosis Information:  ·   s/p L ulna ORIF, L shoulder adhesive capsulitis     Insurance/Certification information:    [facility]  Physician Information:    Arvind Mazariegos MD  Plan of care signed (Y/N):    Visit# / total visits:    4 /    G-Code (if applicable):      Date / Visit # G-Code Applied:         Progress Note: []  Yes  [x]  No  Next due by: Visit #10      Pain level: 0  /10    Subjective:  Pt reports having increase in soreness after last PT visit. She states her ROM continues to improve. Objective:   Observation:    Test measurements:     Palpation:    Exercises:  Exercise Resistance/Repetitions Other comments   Elbow AROM - emphasis on full extension 30x 10   Wrist AROM 30x 10   Wrist prayer stretch 20\" x 5 10   Wrist flex, ext stretches 20\" x 5 10   Left shoulder pendulum 30x cw, ccw 10   Shoulder table dusting X 30 10   Wand ABD AROM 3 x 10 10   Wand shoulder ER 3 x 10 10   Tendon glides 30x 10        MP, IP blocking AROM 30x 10                         Other Therapeutic Activities:      Manual Treatments:  Left shoulder; grade 1-3 mobs, gentle PROM - CAREFUL OF LEFT WRIST/FOREARM WHEN DOING SHOULDER MOBS; grade 1-3 wrist mobs with PROM x 25' total    Modalities:        Timed Code Treatment Minutes:  60      Total Treatment Minutes:  60    Treatment/Activity Tolerance:  [x] Patient tolerated treatment well [] Patient limited by fatigue  [] Patient limited by pain  [] Patient limited by other medical complications  [x] Other: Pt tolerated manual therapy well today; improved overall ROM noted. Pt continues to be especially limited with forearm supination ROM.     Pain after treatment:     0 /10    Prognosis: [x] Good [] Fair  [] Poor    Patient Requires Follow-up: [x] Yes  [] No    Plan:   [x] Continue per plan of care [] Alter current plan (see comments)  [] Plan of care initiated [] Hold pending MD visit [] Discharge    Plan for Next Session:        Electronically signed by:  Michael Jefferson, PT

## 2020-03-17 ENCOUNTER — HOSPITAL ENCOUNTER (OUTPATIENT)
Dept: PHYSICAL THERAPY | Facility: HOSPITAL | Age: 60
Setting detail: THERAPIES SERIES
Discharge: HOME | End: 2020-03-17
Payer: COMMERCIAL

## 2020-03-17 PROCEDURE — 97110 THERAPEUTIC EXERCISES: CPT

## 2020-03-17 PROCEDURE — 97140 MANUAL THERAPY 1/> REGIONS: CPT

## 2020-03-17 NOTE — FLOWSHEET NOTE
Physical Therapy Re-Assessment Note   Date:  3/17/2020    TIme In:   1034                   Time Out:   [address]      Patient Name:  Nely Gonzales    :  1960  MRN: [id number]    Restrictions/Precautions:    Pertinent Medical History:  Medical/Treatment Diagnosis Information:  ·   s/p L ulna ORIF, L shoulder adhesive capsulitis     Insurance/Certification information:    [employer]  Physician Information:    Juan Ramon Carcamo MD  Plan of care signed (Y/N):    Visit# / total visits:    5 /    G-Code (if applicable):      Date / Visit # G-Code Applied:         Progress Note: [x]  Yes  []  No  Next due by: 20      Pain level: 0/10    Subjective:  Pt reports she can see good improvement since starting PT. Objective:   Observation:    Test measurements:  Quick DASH: 9%.     LUE         AROM  PROM   Shoulder flex:  0-114 deg        0-123 deg   Shoulder abd:  0-73 deg 0-77 deg  Shoulder ER:  0-27 deg @ 60 deg abd  Shoulder IR:  0-40 deg @ 60 deg abd  Elbow ext:  Lacks 12 deg to full ext  Forearm Pron:  0-70 deg   Forearm Sup:  0-35 deg  Wrist flex:  0-30 deg  Wrist ext:  0-55 deg  Wrist RD:  0-12 deg  Wrist UD:  0-20 deg     Palpation:    Exercises:  Exercise Resistance/Repetitions Other comments   Elbow AROM - emphasis on full extension 30x 17   Wrist AROM 30x 17   Wrist prayer stretch 20\" x 5 17   Wrist flex, ext stretches 20\" x 5 17   Left shoulder pendulum 30x cw, ccw 17   Shoulder table dusting X 30 17   Wand ABD AROM 3 x 10 17   Wand shoulder ER 3 x 10 17   Tendon glides 30x 17        MP, IP blocking AROM 30x 17                         Other Therapeutic Activities:      Manual Treatments:  Left shoulder; grade 1-3 mobs, gentle PROM - CAREFUL OF LEFT WRIST/FOREARM WHEN DOING SHOULDER MOBS; grade 1-3 wrist mobs with PROM x 30' total    Modalities:        Timed Code Treatment Minutes:  65      Total Treatment Minutes: 65     Treatment/Activity Tolerance:  [x] Patient tolerated treatment well [] Patient

## 2020-03-20 ENCOUNTER — HOSPITAL ENCOUNTER (OUTPATIENT)
Dept: PHYSICAL THERAPY | Facility: HOSPITAL | Age: 60
Setting detail: THERAPIES SERIES
Discharge: HOME | End: 2020-03-20
Payer: COMMERCIAL

## 2020-03-20 PROCEDURE — 97110 THERAPEUTIC EXERCISES: CPT

## 2020-03-20 PROCEDURE — 97140 MANUAL THERAPY 1/> REGIONS: CPT

## 2020-03-20 NOTE — FLOWSHEET NOTE
Physical Therapy Treatment Note   Date:  3/20/2020    TIme In:    2790                  Time Out:     1138    Patient Name:  Rosy Marinelli    :  1960  MRN: [id number]    Restrictions/Precautions:    Pertinent Medical History:  Medical/Treatment Diagnosis Information:  ·   s/p L ulna ORIF, L shoulder adhesive capsulitis     Insurance/Certification information:    [facility]  Physician Information:    Rob Tolliver MD  Plan of care signed (Y/N):    Visit# / total visits:    6 /    G-Code (if applicable):      Date / Visit # G-Code Applied:         Progress Note: []  Yes  [x]  No  Next due by: 20      Pain level: 0/10    Subjective:  Pt reports she can see good improvement since starting PT. Objective:   Observation:    Test measurements:  Quick DASH: 9%.     LUE         AROM  PROM   Shoulder flex:  0-114 deg        0-123 deg   Shoulder abd:  0-73 deg 0-77 deg  Shoulder ER:  0-27 deg @ 60 deg abd  Shoulder IR:  0-40 deg @ 60 deg abd  Elbow ext:  Lacks 12 deg to full ext  Forearm Pron:  0-70 deg   Forearm Sup:  0-35 deg  Wrist flex:  0-30 deg  Wrist ext:  0-55 deg  Wrist RD:  0-12 deg  Wrist UD:  0-20 deg     Palpation:    Exercises:  Exercise Resistance/Repetitions Other comments   Elbow AROM - emphasis on full extension 30x 20   Wrist AROM 30x 20   Wrist prayer stretch 20\" x 5 20   Wrist flex, ext stretches 20\" x 5 20   Left shoulder pendulum 30x cw, ccw 20   Shoulder table dusting X 30 20   Wand ABD AROM 3 x 10 20   Wand shoulder ER 3 x 10 20   Tendon glides 30x 20        MP, IP blocking AROM 30x 20                         Other Therapeutic Activities:      Manual Treatments:  Left shoulder; grade 1-3 mobs, gentle PROM - CAREFUL OF LEFT WRIST/FOREARM WHEN DOING SHOULDER MOBS; grade 1-3 wrist mobs with PROM x 25' total    Modalities:        Timed Code Treatment Minutes:  58'      Total Treatment Minutes:   79'    Treatment/Activity Tolerance:  [x] Patient tolerated treatment well [] Patient limited by fatigue  [] Patient limited by pain  [] Patient limited by other medical complications  [x] Other: Pt is demonstrating a gradual increase in ROM since starting PT. She is lacking most with forearm supination and shoulder ROM. She will continue to benefit from skilled PT to further address ROM and functional strengthening. Pain after treatment:     0/10    Prognosis: [x] Good [] Fair  [] Poor    Patient Requires Follow-up: [x] Yes  [] No    Plan:   [x] Continue per plan of care [] Alter current plan (see comments)  [] Plan of care initiated [] Hold pending MD visit [] Discharge    Plan for Next Session:      Goals  Short term goals  Time Frame for Short term goals: 4 weeks  Short term goal 1: Pt to be I with HEP -MET  Short term goal 2: Pt to demonstrate full L elbow ext AROM  Short term goal 3: Pt to demonstrate a 20 deg increase in L shoulder flex / abd AROM  Short term goal 4: L wrist AROM to improve by 25% grossly -progressing  Long term goals  Time Frame for Long term goals : 8 weeks  Long term goal 1: Quick DASH score to improve to 25% or less indicating improved function.   Long term goal 2: Pt to demonstrate L shoulder AROM within 80% of normal limits  Long term goal 3: Pt to demonstrate L wrist flex / ext AROM of 0-60 deg or greater  Long term goal 4: L forearm supination to improve to 0-75 deg or greater  Long term goal 5: Pt to demonstrate 5/5 L UE strength grossly        Electronically signed by:  Rosi Knight PT

## 2020-03-24 ENCOUNTER — HOSPITAL ENCOUNTER (OUTPATIENT)
Dept: PHYSICAL THERAPY | Facility: HOSPITAL | Age: 60
Setting detail: THERAPIES SERIES
Discharge: HOME | End: 2020-03-24
Payer: COMMERCIAL

## 2020-03-24 PROCEDURE — 97140 MANUAL THERAPY 1/> REGIONS: CPT

## 2020-03-24 PROCEDURE — 97110 THERAPEUTIC EXERCISES: CPT

## 2020-03-24 NOTE — FLOWSHEET NOTE
Physical Therapy Treatment Note   Date:  3/24/2020    TIme In:    0017                  Time Out:     [address]    Patient Name:  Carole Gillis    :  1960  MRN: [id number]    Restrictions/Precautions:    Pertinent Medical History:  Medical/Treatment Diagnosis Information:  ·   s/p L ulna ORIF, L shoulder adhesive capsulitis     Insurance/Certification information:    [facility]  Physician Information:    Arvind Mazariegos MD  Plan of care signed (Y/N):    Visit# / total visits:    6 /    G-Code (if applicable):      Date / Visit # G-Code Applied:         Progress Note: []  Yes  [x]  No  Next due by: 20      Pain level: 0/10    Subjective:  Pt reports she can see good improvement since starting PT. Objective:   Observation:    Test measurements:  Quick DASH: 9%.     LUE         AROM  PROM   Shoulder flex:  0-114 deg        0-123 deg   Shoulder abd:  0-73 deg 0-77 deg  Shoulder ER:  0-27 deg @ 60 deg abd  Shoulder IR:  0-40 deg @ 60 deg abd  Elbow ext:  Lacks 12 deg to full ext  Forearm Pron:  0-70 deg   Forearm Sup:  0-35 deg  Wrist flex:  0-30 deg  Wrist ext:  0-55 deg  Wrist RD:  0-12 deg  Wrist UD:  0-20 deg     Palpation:    Exercises:  Exercise Resistance/Repetitions Other comments   Elbow AROM - emphasis on full extension 30x 24   Wrist AROM 30x 24   Wrist prayer stretch 20\" x 5 24   Wrist flex, ext stretches 20\" x 5 24   Left shoulder pendulum 30x cw, ccw 24   Shoulder table dusting X 30 24   Wand ABD AROM 3 x 10 24   Wand shoulder ER 3 x 10 24   Tendon glides 30x 24        MP, IP blocking AROM 30x 24        Pulleys:  3' x 2 24               Other Therapeutic Activities:      Manual Treatments:  Left shoulder; grade 1-3 mobs, gentle PROM - CAREFUL OF LEFT WRIST/FOREARM WHEN DOING SHOULDER MOBS; grade 1-3 wrist mobs with PROM + PROM fingers x 25' total    Modalities:        Timed Code Treatment Minutes: 72 '      Total Treatment Minutes:  68 '    Treatment/Activity Tolerance:  [x] Patient tolerated

## 2020-03-27 ENCOUNTER — APPOINTMENT (OUTPATIENT)
Dept: PHYSICAL THERAPY | Facility: HOSPITAL | Age: 60
End: 2020-03-27
Payer: COMMERCIAL

## 2020-03-31 ENCOUNTER — HOSPITAL ENCOUNTER (OUTPATIENT)
Dept: PHYSICAL THERAPY | Facility: HOSPITAL | Age: 60
Setting detail: THERAPIES SERIES
Discharge: HOME | End: 2020-03-31
Payer: COMMERCIAL

## 2020-03-31 PROCEDURE — 97140 MANUAL THERAPY 1/> REGIONS: CPT

## 2020-03-31 PROCEDURE — 97110 THERAPEUTIC EXERCISES: CPT

## 2020-03-31 NOTE — FLOWSHEET NOTE
Physical Therapy Treatment Note   Date:  3/31/2020    TIme In:    1039                 Time Out:  1033       Patient Name:  Raul Torres    :  1960  MRN: [id number]    Restrictions/Precautions:    Pertinent Medical History:  Medical/Treatment Diagnosis Information:  ·   s/p L ulna ORIF, L shoulder adhesive capsulitis     Insurance/Certification information:    [facility]  Physician Information:    Joseph Adhikari MD  Plan of care signed (Y/N):    Visit# / total visits:    7/    G-Code (if applicable):      Date / Visit # G-Code Applied:         Progress Note: []  Yes  [x]  No  Next due by: 20      Pain level: 0/10    Subjective:  Pt reports having no pain currently. She continues to have difficulty with supination. She is scheduled to return to her ortho on 20. Objective:   Observation:    Test measurements:  Quick DASH: 9%.     LUE         AROM  PROM   Shoulder flex:  0-114 deg        0-123 deg   Shoulder abd:  0-73 deg 0-77 deg  Shoulder ER:  0-27 deg @ 60 deg abd  Shoulder IR:  0-40 deg @ 60 deg abd  Elbow ext:  Lacks 12 deg to full ext  Forearm Pron:  0-70 deg   Forearm Sup:  0-35 deg  Wrist flex:  0-30 deg  Wrist ext:  0-55 deg  Wrist RD:  0-12 deg  Wrist UD:  0-20 deg     Palpation:    Exercises:  Exercise Resistance/Repetitions Other comments   Elbow AROM - emphasis on full extension 30x, 2# dumbbell 31   Wrist AROM 30x, 2# w/ pron / sup 31   Wrist prayer stretch 20\" x 5 31   Wrist flex, ext stretches 20\" x 5 31   Left shoulder pendulum 30x cw, ccw 31   Shoulder table dusting X 30 31   Wand ABD AROM 3 x 10 31   Wand shoulder ER 3 x 10 31   Tendon glides 30x 31        MP, IP blocking AROM 30x 31        Pulleys:  3' x 2 31               Other Therapeutic Activities:      Manual Treatments:  Left shoulder; grade 1-3 mobs, gentle PROM - CAREFUL OF LEFT WRIST/FOREARM WHEN DOING SHOULDER MOBS; grade 1-3 wrist mobs with PROM + PROM fingers x 25' total    Modalities:        Timed Code

## 2020-04-03 ENCOUNTER — HOSPITAL ENCOUNTER (OUTPATIENT)
Dept: PHYSICAL THERAPY | Facility: HOSPITAL | Age: 60
Setting detail: THERAPIES SERIES
Discharge: HOME | End: 2020-04-03
Payer: COMMERCIAL

## 2020-04-03 PROCEDURE — 97140 MANUAL THERAPY 1/> REGIONS: CPT

## 2020-04-03 PROCEDURE — 97110 THERAPEUTIC EXERCISES: CPT

## 2020-04-06 ENCOUNTER — TELEPHONE (OUTPATIENT)
Dept: ORTHOPEDIC SURGERY | Facility: CLINIC | Age: 60
End: 2020-04-06

## 2020-04-06 NOTE — TELEPHONE ENCOUNTER
Spoke with patient in regards to her appointment on 04/06/2020 we had to cancel her appointment and she states that would be fine because she is doing great.

## 2020-04-10 ENCOUNTER — HOSPITAL ENCOUNTER (OUTPATIENT)
Dept: PHYSICAL THERAPY | Facility: HOSPITAL | Age: 60
Setting detail: THERAPIES SERIES
Discharge: HOME | End: 2020-04-10
Payer: COMMERCIAL

## 2020-04-10 PROCEDURE — 97110 THERAPEUTIC EXERCISES: CPT

## 2020-04-10 PROCEDURE — 97140 MANUAL THERAPY 1/> REGIONS: CPT

## 2020-04-10 NOTE — FLOWSHEET NOTE
Treatment Minutes:   48 '      Total Treatment Minutes:   54 '    Treatment/Activity Tolerance:  [x] Patient tolerated treatment well [] Patient limited by fatigue  [] Patient limited by pain  [] Patient limited by other medical complications  [x] Other:     Pain after treatment:     0/10    Prognosis: [x] Good [] Fair  [] Poor    Patient Requires Follow-up: [x] Yes  [] No    Plan:   [x] Continue per plan of care [] Alter current plan (see comments)  [] Plan of care initiated [] Hold pending MD visit [] Discharge    Plan for Next Session:      Goals  Short term goals  Time Frame for Short term goals: 4 weeks  Short term goal 1: Pt to be I with HEP -MET  Short term goal 2: Pt to demonstrate full L elbow ext AROM  Short term goal 3: Pt to demonstrate a 20 deg increase in L shoulder flex / abd AROM  Short term goal 4: L wrist AROM to improve by 25% grossly -progressing  Long term goals  Time Frame for Long term goals : 8 weeks  Long term goal 1: Quick DASH score to improve to 25% or less indicating improved function.   Long term goal 2: Pt to demonstrate L shoulder AROM within 80% of normal limits  Long term goal 3: Pt to demonstrate L wrist flex / ext AROM of 0-60 deg or greater  Long term goal 4: L forearm supination to improve to 0-75 deg or greater  Long term goal 5: Pt to demonstrate 5/5 L UE strength grossly        Electronically signed by:  Kevin Dominguez, PT

## 2020-04-17 ENCOUNTER — HOSPITAL ENCOUNTER (OUTPATIENT)
Dept: PHYSICAL THERAPY | Facility: HOSPITAL | Age: 60
Setting detail: THERAPIES SERIES
Discharge: HOME | End: 2020-04-17
Payer: COMMERCIAL

## 2020-04-17 PROCEDURE — 97140 MANUAL THERAPY 1/> REGIONS: CPT

## 2020-04-17 PROCEDURE — 97110 THERAPEUTIC EXERCISES: CPT

## 2020-04-21 ENCOUNTER — HOSPITAL ENCOUNTER (OUTPATIENT)
Dept: PHYSICAL THERAPY | Facility: HOSPITAL | Age: 60
Setting detail: THERAPIES SERIES
Discharge: HOME | End: 2020-04-21
Payer: COMMERCIAL

## 2020-04-21 ENCOUNTER — APPOINTMENT (OUTPATIENT)
Dept: PHYSICAL THERAPY | Facility: HOSPITAL | Age: 60
End: 2020-04-21
Payer: COMMERCIAL

## 2020-04-21 PROCEDURE — 97140 MANUAL THERAPY 1/> REGIONS: CPT

## 2020-04-21 PROCEDURE — 97110 THERAPEUTIC EXERCISES: CPT

## 2020-04-24 ENCOUNTER — APPOINTMENT (OUTPATIENT)
Dept: PHYSICAL THERAPY | Facility: HOSPITAL | Age: 60
End: 2020-04-24
Payer: COMMERCIAL

## 2020-04-28 ENCOUNTER — HOSPITAL ENCOUNTER (OUTPATIENT)
Dept: PHYSICAL THERAPY | Facility: HOSPITAL | Age: 60
Setting detail: THERAPIES SERIES
Discharge: HOME | End: 2020-04-28
Payer: COMMERCIAL

## 2020-04-28 PROCEDURE — 97530 THERAPEUTIC ACTIVITIES: CPT

## 2020-04-28 PROCEDURE — 97140 MANUAL THERAPY 1/> REGIONS: CPT

## 2020-04-28 NOTE — FLOWSHEET NOTE
Physical Therapy Daily Treatment Note   Date:  2020    TIme In:    1124                Time Out:     1226    Patient Name:  Carolina Brambila    :  1960  MRN: [id number]    Restrictions/Precautions:    Pertinent Medical History:  Medical/Treatment Diagnosis Information:  ·   s/p L ulna ORIF, L shoulder adhesive capsulitis     Insurance/Certification information:    [facility]  Physician Information:    Mary Walker MD  Plan of care signed (Y/N):    Visit# / total visits:    12 /    G-Code (if applicable):      Date / Visit # G-Code Applied:         Progress Note: []  Yes  [x]  No  Next due by: 20      Pain level: 0/10    Subjective:  Pt reports she is having no pain currently; seeing steady progress. Objective:   Observation:    Test measurements:  Quick DASH: 11%.     LUE         AROM  PROM   Shoulder flex:  0-130 deg        0-135 deg   Shoulder abd:  0-125 deg 0-130 deg  Shoulder ER:  0-55 deg @ 60 deg abd - 60  Shoulder IR:  0-55 deg @ 60 deg abd - 60  Elbow ext:  Lacks 12 deg to full ext  Forearm Pron:  0-70 deg   Forearm Sup:  0-35 deg  Wrist flex:  0-50 deg  Wrist ext:  0-70 deg  Wrist RD:  0-12 deg  Wrist UD:  0-20 deg     Palpation:    Exercises:  Exercise Resistance/Repetitions Other comments   Elbow AROM - emphasis on full extension 30x, 2# dumbbell 28   Wrist AROM 30x, 2# w/ pron / sup 28   Wrist prayer stretch 20\" x 5 28   Wrist flex, ext stretches 20\" x 5 28   Left shoulder pendulum 30x cw, ccw 28   Shoulder table dusting X 30 28   Wand ABD, FF, AROM 3 x 10 28   Wand shoulder ER 3 x 10 28   Tendon glides 30x 28        MP, IP blocking AROM 30x 28        Pulleys:  3' x 2 28   Putty: Tan: 2' x 2 28          Other Therapeutic Activities:      Manual Treatments:  Left shoulder; grade 1-3 mobs, gentle PROM - CAREFUL OF LEFT WRIST/FOREARM WHEN DOING SHOULDER MOBS; grade 1-3 wrist mobs with PROM + PROM fingers x 25' total    Modalities:        Timed Code Treatment Minutes:

## 2020-05-05 ENCOUNTER — HOSPITAL ENCOUNTER (OUTPATIENT)
Dept: PHYSICAL THERAPY | Facility: HOSPITAL | Age: 60
Setting detail: THERAPIES SERIES
Discharge: HOME | End: 2020-05-05
Payer: COMMERCIAL

## 2020-05-05 PROCEDURE — 97110 THERAPEUTIC EXERCISES: CPT

## 2020-05-05 PROCEDURE — 97140 MANUAL THERAPY 1/> REGIONS: CPT

## 2020-05-05 NOTE — FLOWSHEET NOTE
total    Modalities:        Timed Code Treatment Minutes:  61  '      Total Treatment Minutes:  59 '    Treatment/Activity Tolerance:  [x] Patient tolerated treatment well [] Patient limited by fatigue  [] Patient limited by pain  [] Patient limited by other medical complications  [x] Other:     Pain after treatment:     0/10    Prognosis: [x] Good [] Fair  [] Poor    Patient Requires Follow-up: [x] Yes  [] No    Plan:   [x] Continue per plan of care [] Alter current plan (see comments)  [] Plan of care initiated [] Hold pending MD visit [] Discharge    Plan for Next Session:      Goals  Short term goals  Time Frame for Short term goals: 4 weeks  Short term goal 1: Pt to be I with HEP -MET  Short term goal 2: Pt to demonstrate full L elbow ext AROM - not met, but improving  Short term goal 3: Pt to demonstrate a 20 deg increase in L shoulder flex / abd AROM  Short term goal 4: L wrist AROM to improve by 25% grossly -progressing  Long term goals  Time Frame for Long term goals : 8 weeks  Long term goal 1: Quick DASH score to improve to 25% or less indicating improved function.  - met  Long term goal 2: Pt to demonstrate L shoulder AROM within 80% of normal limits  Long term goal 3: Pt to demonstrate L wrist flex / ext AROM of 0-60 deg or greater - partially met  Long term goal 4: L forearm supination to improve to 0-75 deg or greater - not met  Long term goal 5: Pt to demonstrate 5/5 L UE strength grossly     Patient continues to show gradual, but steady progress with ROM and functional capacity; continued skilled PT required to help her progress and achieve her maximum functional, recovery.       Electronically signed by:  Rachna Krishnamurthy, PT

## 2020-05-12 ENCOUNTER — APPOINTMENT (OUTPATIENT)
Dept: PHYSICAL THERAPY | Facility: HOSPITAL | Age: 60
End: 2020-05-12
Payer: COMMERCIAL

## 2020-05-19 ENCOUNTER — HOSPITAL ENCOUNTER (OUTPATIENT)
Dept: PHYSICAL THERAPY | Facility: HOSPITAL | Age: 60
Setting detail: THERAPIES SERIES
End: 2020-05-19
Payer: COMMERCIAL

## 2020-05-26 RX ORDER — SUMATRIPTAN 100 MG/1
100 TABLET, FILM COATED ORAL
Qty: 9 TABLET | Refills: 5 | OUTPATIENT
Start: 2020-05-26

## 2020-05-27 RX ORDER — SUMATRIPTAN 100 MG/1
100 TABLET, FILM COATED ORAL
Qty: 9 TABLET | Refills: 5 | OUTPATIENT
Start: 2020-05-27

## 2020-05-27 NOTE — PROGRESS NOTES
You have chosen to receive care through a telephone visit. Do you consent to use a telephone visit for your medical care today? Yes    On this call are hSarda Broderick, Inocencio Oliver CMA, and Marisol Catherine DO

## 2020-05-28 ENCOUNTER — OFFICE VISIT (OUTPATIENT)
Dept: INTERNAL MEDICINE | Facility: CLINIC | Age: 60
End: 2020-05-28

## 2020-05-28 DIAGNOSIS — G43.909 MIGRAINE WITHOUT STATUS MIGRAINOSUS, NOT INTRACTABLE, UNSPECIFIED MIGRAINE TYPE: Primary | ICD-10-CM

## 2020-05-28 DIAGNOSIS — Z12.11 SCREEN FOR COLON CANCER: ICD-10-CM

## 2020-05-28 DIAGNOSIS — Z13.0 SCREENING FOR DISORDER OF BLOOD AND BLOOD-FORMING ORGANS: ICD-10-CM

## 2020-05-28 DIAGNOSIS — N95.1 MENOPAUSAL SYNDROME: ICD-10-CM

## 2020-05-28 DIAGNOSIS — Z13.29 ENCOUNTER FOR SCREENING FOR ENDOCRINE DISORDER: ICD-10-CM

## 2020-05-28 DIAGNOSIS — Z13.220 LIPID SCREENING: ICD-10-CM

## 2020-05-28 DIAGNOSIS — Z13.1 DIABETES MELLITUS SCREENING: ICD-10-CM

## 2020-05-28 PROCEDURE — 99441 PR PHYS/QHP TELEPHONE EVALUATION 5-10 MIN: CPT | Performed by: FAMILY MEDICINE

## 2020-05-28 RX ORDER — SUMATRIPTAN 100 MG/1
100 TABLET, FILM COATED ORAL
Qty: 9 TABLET | Refills: 5 | Status: SHIPPED | OUTPATIENT
Start: 2020-05-28 | End: 2020-11-22 | Stop reason: SDUPTHER

## 2020-05-28 NOTE — PROGRESS NOTES
Sharda Broderick is a 60 y.o. female.    Chief Complaint   Patient presents with   • Migraine     During today's visit, I reviewed the documented allergies, medications, chief complaint, and pertinent vitals.  I have confirmed with the patient that there have been no changes since this information was discussed with my clinical team member.        HPI   Patient is following up via telephone today for migraines.  She reports she is only taking topamax every other day for migraine prevention as she is sure topamax has cause tinnitus.  Patient can go 6 weeks without a migraine.  She does report imitrex is working well for quick relief.      Patient is taking estradiol as well for menopausal syndrome with good response as well.  She does not take the medication every day.      She is interested in having screening labs done.  She is also willing to do cologard for colon cancer screening.    The following portions of the patient's history were reviewed and updated as appropriate: allergies, current medications, past family history, past medical history, past social history, past surgical history and problem list.     No Known Allergies      Current Outpatient Medications:   •  estradiol (ESTRACE) 0.5 MG tablet, TAKE ONE TABLET BY MOUTH DAILY, Disp: 90 tablet, Rfl: 0  •  ibuprofen (ADVIL,MOTRIN) 800 MG tablet, Take 1 tablet by mouth 3 (Three) Times a Day., Disp: 90 tablet, Rfl: 0  •  SUMAtriptan (Imitrex) 100 MG tablet, Take 1 tablet by mouth Every 2 (Two) Hours As Needed for Migraine., Disp: 9 tablet, Rfl: 5  •  topiramate (TOPAMAX) 25 MG tablet, Take 1 tablet by mouth Daily., Disp: 90 tablet, Rfl: 2    ROS    Review of Systems   Constitutional: Negative for chills, fatigue and fever.   HENT: Negative for congestion and rhinorrhea.    Respiratory: Negative for cough and shortness of breath.    Cardiovascular: Negative for chest pain.   Gastrointestinal: Negative for constipation, nausea and vomiting.    Musculoskeletal: Negative for arthralgias and back pain.   Neurological: Positive for headache.       There were no vitals filed for this visit.  There is no height or weight on file to calculate BMI.    Physical Exam     Physical Exam    Assessment/Plan    Problem List Items Addressed This Visit        Cardiovascular and Mediastinum    Migraine without status migrainosus, not intractable - Primary     Stable on topamax.  Patient is slowly weaning off the medication due to suspected side effect of tinnitus.  May continue imitrex for quick relief of migraines.            Relevant Medications    SUMAtriptan (Imitrex) 100 MG tablet       Other    Menopausal syndrome     Stable on estradiol.           Other Visit Diagnoses     Screen for colon cancer        Relevant Orders    Cologuard - Stool, Per Rectum    Lipid screening        Relevant Orders    Lipid Panel    Encounter for screening for endocrine disorder        Relevant Orders    TSH    Comprehensive Metabolic Panel    Screening for disorder of blood and blood-forming organs        Relevant Orders    CBC & Differential    Diabetes mellitus screening        Relevant Orders    Hemoglobin A1c          New Medications Ordered This Visit   Medications   • SUMAtriptan (Imitrex) 100 MG tablet     Sig: Take 1 tablet by mouth Every 2 (Two) Hours As Needed for Migraine.     Dispense:  9 tablet     Refill:  5       This visit has been rescheduled as a phone visit to comply with patient safety concerns in accordance with CDC recommendations. Total time of discussion was 7 minutes.      No orders of the defined types were placed in this encounter.      No follow-ups on file.    Marisol Catherine DO
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
## 2020-05-30 NOTE — ASSESSMENT & PLAN NOTE
Stable on topamax.  Patient is slowly weaning off the medication due to suspected side effect of tinnitus.  May continue imitrex for quick relief of migraines.

## 2020-11-23 RX ORDER — ESTRADIOL 0.5 MG/1
0.5 TABLET ORAL DAILY
Qty: 90 TABLET | Refills: 2 | Status: SHIPPED | OUTPATIENT
Start: 2020-11-23

## 2020-11-23 RX ORDER — TOPIRAMATE 25 MG/1
25 TABLET ORAL DAILY
Qty: 90 TABLET | Refills: 2 | Status: SHIPPED | OUTPATIENT
Start: 2020-11-23

## 2020-11-23 RX ORDER — SUMATRIPTAN 100 MG/1
100 TABLET, FILM COATED ORAL
Qty: 9 TABLET | Refills: 2 | Status: SHIPPED | OUTPATIENT
Start: 2020-11-23 | End: 2021-01-31 | Stop reason: SDUPTHER

## 2021-02-01 RX ORDER — SUMATRIPTAN 100 MG/1
100 TABLET, FILM COATED ORAL
Qty: 9 TABLET | Refills: 3 | Status: SHIPPED | OUTPATIENT
Start: 2021-02-01 | End: 2021-06-23

## 2021-06-10 ENCOUNTER — OFFICE VISIT (OUTPATIENT)
Dept: ORTHOPEDIC SURGERY | Facility: CLINIC | Age: 61
End: 2021-06-10

## 2021-06-10 VITALS — HEIGHT: 69 IN | WEIGHT: 157 LBS | BODY MASS INDEX: 23.25 KG/M2

## 2021-06-10 DIAGNOSIS — M25.512 PAIN IN JOINT OF LEFT SHOULDER: Primary | ICD-10-CM

## 2021-06-10 PROCEDURE — 99214 OFFICE O/P EST MOD 30 MIN: CPT | Performed by: ORTHOPAEDIC SURGERY

## 2021-06-10 NOTE — PROGRESS NOTES
Subjective   Patient ID: Sharda Broderick is a 61 y.o. female  Follow-up of the Left Shoulder (Established patient here with shoulder pain.  History of adhesive capsulitis. States she was doing well until she fell in late April 2021. Has pain only with abduction.)             History of Present Illness  Right-hand-dominant female slipped on his icy porch at the end of April landing directly on her left shoulder has a history of adhesive capsulitis was doing well until she fell.  Pain is mostly anterior hurts to reach across her chest has not noticed any weakness has some residual paresthesias in the left dorsal radial forearm since she fractured her ulna many years ago.  No complaints of neck pain weakness in the hands elbow pain loss of motion in the elbow or significant loss of motion actively in her left shoulder.      Review of Systems   Constitutional: Negative for diaphoresis, fever and unexpected weight change.   HENT: Negative for dental problem and sore throat.    Eyes: Negative for visual disturbance.   Respiratory: Negative for shortness of breath.    Cardiovascular: Negative for chest pain.   Gastrointestinal: Negative for abdominal pain, constipation, diarrhea, nausea and vomiting.   Genitourinary: Negative for difficulty urinating and frequency.   Musculoskeletal: Positive for arthralgias.   Neurological: Negative for headaches.   Hematological: Does not bruise/bleed easily.   All other systems reviewed and are negative.      Past Medical History:   Diagnosis Date   • Headache    • Left wrist injury     horse kicked during feeding   • Migraines    • Shoulder fracture, left     at age 12 from falling off horse   • Tinnitus    • Wears dentures     upper only    • Wears glasses         Past Surgical History:   Procedure Laterality Date   • HYSTERECTOMY      complete   • ORIF ULNA/RADIUS FRACTURES Left 12/2/2019    Procedure: ULNA OPEN REDUCTION INTERNAL FIXATION (SYNTHES);  Surgeon: Trey Macdonald,  "MD;  Location: [facility] OR;  Service: Orthopedics   • TOOTH EXTRACTION         Family History   Problem Relation Age of Onset   • Hypertension Mother    • Heart disease Mother    • COPD Mother    • No Known Problems Brother    • Heart disease Brother    • COPD Brother        Social History     Socioeconomic History   • Marital status:      Spouse name: Not on file   • Number of children: Not on file   • Years of education: Not on file   • Highest education level: Not on file   Tobacco Use   • Smoking status: Former Smoker     Packs/day: 0.25     Types: Cigarettes     Quit date: 2011     Years since quitting: 10.4   • Smokeless tobacco: Never Used   Substance and Sexual Activity   • Alcohol use: Yes     Comment: rarely   • Drug use: No   • Sexual activity: Defer     Partners: Male     Birth control/protection: Surgical       I have reviewed the medical and surgical history, family history, social history, medications, and/or allergies, and the review of systems of this report.    No Known Allergies      Current Outpatient Medications:   •  estradiol (ESTRACE) 0.5 MG tablet, Take 1 tablet by mouth Daily., Disp: 90 tablet, Rfl: 2  •  SUMAtriptan (Imitrex) 100 MG tablet, Take 1 tablet by mouth Every 2 (Two) Hours As Needed for Migraine., Disp: 9 tablet, Rfl: 3  •  topiramate (TOPAMAX) 25 MG tablet, Take 1 tablet by mouth Daily., Disp: 90 tablet, Rfl: 2  •  ibuprofen (ADVIL,MOTRIN) 800 MG tablet, Take 1 tablet by mouth 3 (Three) Times a Day., Disp: 90 tablet, Rfl: 0    Objective   Ht 175.3 cm (69\")   Wt 71.2 kg (157 lb)   BMI 23.18 kg/m²    Physical Exam  Constitutional: Patient is oriented to person, place, and time. Patient appears well-developed and well-nourished.   HENT:Head: Normocephalic and atraumatic.   Eyes: EOM are normal. Pupils are equal, round, and reactive to light.   Neck: Normal range of motion. Neck supple.   Cardiovascular: Normal rate.    Pulmonary/Chest: Effort normal and breath sounds normal. "   Abdominal: Soft.   Neurological: Patient is alert and oriented to person, place, and time.   Skin: Skin is warm and dry.   Psychiatric: Patient has a normal mood and affect.   Nursing note and vitals reviewed.       [unfilled]   Left shoulder: Minimal tenderness the anterior subacromial region full forward flexion full abduction no weakness negative drop arm sign negative impingement sign very minimal pain with cross body abduction over the anterior glenohumeral area, negative speeds sign negative proximal biceps tenderness no atrophy normal scaption    Assessment/Plan   Review of Radiographic Studies:    Radiographic images today of affected area I personally viewed and showed no sign of acute fracture or dislocation.      Procedures     Diagnoses and all orders for this visit:    1. Pain in joint of left shoulder (Primary)  -     XR Shoulder 2+ View Left; Future       Orthopedic activities reviewed and patient expressed appreciation and Directly reviewed with her the [facility] rehab protocol      Recommendations/Plan:   Work/Activity Status: May perform usual activities as tolerated    Patient agreeable to call or return sooner for any concerns.             Impression:  Probable strain left shoulder bone bruise possible rotator cuff injury  Plan:  Home exercise if not better she will call in 6 to 8 weeks schedule MRI left shoulder return to see me after MRI complete

## 2021-06-23 RX ORDER — SUMATRIPTAN 100 MG/1
TABLET, FILM COATED ORAL
Qty: 9 TABLET | Refills: 1 | Status: SHIPPED | OUTPATIENT
Start: 2021-06-23 | End: 2021-09-03

## 2021-09-03 RX ORDER — SUMATRIPTAN 100 MG/1
TABLET, FILM COATED ORAL
Qty: 9 TABLET | Refills: 0 | Status: SHIPPED | OUTPATIENT
Start: 2021-09-03 | End: 2021-11-05

## 2021-11-05 RX ORDER — SUMATRIPTAN 100 MG/1
TABLET, FILM COATED ORAL
Qty: 9 TABLET | Refills: 0 | Status: SHIPPED | OUTPATIENT
Start: 2021-11-05

## 2022-07-08 ENCOUNTER — HOSPITAL ENCOUNTER (OUTPATIENT)
Facility: HOSPITAL | Age: 62
Discharge: HOME | End: 2022-07-08
Payer: COMMERCIAL

## 2022-07-08 LAB
A/G RATIO: 2.1 (ref 0.8–2)
ALBUMIN SERPL-MCNC: 4.9 G/DL (ref 3.4–4.8)
ALP BLD-CCNC: 73 U/L (ref 25–100)
ALT SERPL-CCNC: 11 U/L (ref 4–36)
ANION GAP SERPL CALCULATED.3IONS-SCNC: 13 MMOL/L (ref 3–16)
AST SERPL-CCNC: 20 U/L (ref 8–33)
BASOPHILS ABSOLUTE: 0.1 K/UL (ref 0–0.1)
BASOPHILS RELATIVE PERCENT: 0.8 %
BILIRUB SERPL-MCNC: 0.5 MG/DL (ref 0.3–1.2)
BUN BLDV-MCNC: 21 MG/DL (ref 6–20)
CALCIUM SERPL-MCNC: 10 MG/DL (ref 8.5–10.5)
CHLORIDE BLD-SCNC: 104 MMOL/L (ref 98–107)
CHOLESTEROL, TOTAL: 236 MG/DL (ref 0–200)
CO2: 21 MMOL/L (ref 20–30)
CREAT SERPL-MCNC: 0.9 MG/DL (ref 0.4–1.2)
EOSINOPHILS ABSOLUTE: 0 K/UL (ref 0–0.4)
EOSINOPHILS RELATIVE PERCENT: 0.3 %
FOLATE: >20 NG/ML
GFR AFRICAN AMERICAN: >59
GFR NON-AFRICAN AMERICAN: >60
GLOBULIN: 2.3 G/DL
GLUCOSE BLD-MCNC: 99 MG/DL (ref 74–106)
HCT VFR BLD CALC: 43 % (ref 37–47)
HDLC SERPL-MCNC: 79 MG/DL (ref 40–60)
HEMOGLOBIN: 14.1 G/DL (ref 11.5–16.5)
IMMATURE GRANULOCYTES #: 0 K/UL
IMMATURE GRANULOCYTES %: 0.2 % (ref 0–5)
LDL CHOLESTEROL CALCULATED: 140 MG/DL
LYMPHOCYTES ABSOLUTE: 1.1 K/UL (ref 1.5–4)
LYMPHOCYTES RELATIVE PERCENT: 17.6 %
MCH RBC QN AUTO: 30.8 PG (ref 27–32)
MCHC RBC AUTO-ENTMCNC: 32.8 G/DL (ref 31–35)
MCV RBC AUTO: 93.9 FL (ref 80–100)
MONOCYTES ABSOLUTE: 0.3 K/UL (ref 0.2–0.8)
MONOCYTES RELATIVE PERCENT: 4.4 %
NEUTROPHILS ABSOLUTE: 4.6 K/UL (ref 2–7.5)
NEUTROPHILS RELATIVE PERCENT: 76.7 %
PDW BLD-RTO: 12.8 % (ref 11–16)
PLATELET # BLD: 234 K/UL (ref 150–400)
PMV BLD AUTO: 10.5 FL (ref 6–10)
POTASSIUM SERPL-SCNC: 4 MMOL/L (ref 3.4–5.1)
RBC # BLD: 4.58 M/UL (ref 3.8–5.8)
SODIUM BLD-SCNC: 138 MMOL/L (ref 136–145)
TOTAL PROTEIN: 7.2 G/DL (ref 6.4–8.3)
TRIGL SERPL-MCNC: 86 MG/DL (ref 0–249)
TSH SERPL DL<=0.05 MIU/L-ACNC: 1.42 UIU/ML (ref 0.27–4.2)
VITAMIN B-12: 406 PG/ML (ref 211–911)
VLDLC SERPL CALC-MCNC: 17 MG/DL
WBC # BLD: 6 K/UL (ref 4–11)

## 2022-07-08 PROCEDURE — 80061 LIPID PANEL: CPT

## 2022-07-08 PROCEDURE — 85025 COMPLETE CBC W/AUTO DIFF WBC: CPT

## 2022-07-08 PROCEDURE — 36415 COLL VENOUS BLD VENIPUNCTURE: CPT

## 2022-07-08 PROCEDURE — 82746 ASSAY OF FOLIC ACID SERUM: CPT

## 2022-07-08 PROCEDURE — 82607 VITAMIN B-12: CPT

## 2022-07-08 PROCEDURE — 80053 COMPREHEN METABOLIC PANEL: CPT

## 2022-07-08 PROCEDURE — 84443 ASSAY THYROID STIM HORMONE: CPT

## (undated) DEVICE — BIT DRL QC DIA 2.5X110MM

## (undated) DEVICE — BANDAGE,GAUZE,CONFORMING,4"X75",STRL,LF: Brand: MEDLINE

## (undated) DEVICE — SPNG GZ WOVN 4X4IN 12PLY 10/BX STRL

## (undated) DEVICE — BNDG ELAS MATRX V/CLS 4IN 5YD LF

## (undated) DEVICE — PK EXTRM UPPR 20

## (undated) DEVICE — GLV SURG SENSICARE W/ALOE PF LF 7.5 STRL

## (undated) DEVICE — COTTON UNDERCAST PADDING,REGULAR FINISH: Brand: WEBRIL

## (undated) DEVICE — DISPOSABLE TOURNIQUET CUFF SINGLE BLADDER, SINGLE PORT AND LUER LOCK CONNECTOR: Brand: COLOR CUFF

## (undated) DEVICE — BNDG ELAS ELITE V/CLOSE 4IN 5YD LF

## (undated) DEVICE — PAD,ABDOMINAL,5"X9",STERILE,LF,1/PK: Brand: MEDLINE INDUSTRIES, INC.

## (undated) DEVICE — CLAVICLE STRAP: Brand: DEROYAL

## (undated) DEVICE — BIT DRL QC DIA 2.8X165MM NS

## (undated) DEVICE — NON-ADHERENT STRIPS,OIL EMULSION: Brand: CURITY

## (undated) DEVICE — GLV SURG SENSICARE W/ALOE PF LF 8 STRL

## (undated) DEVICE — SCRW CORT S/TAP 3.5X10MM
Type: IMPLANTABLE DEVICE | Site: WRIST | Status: NON-FUNCTIONAL
Removed: 2019-12-02

## (undated) DEVICE — SUT ETHLN 4/0 PS2 PLSTC 1667G

## (undated) DEVICE — FLEXIBLE YANKAUER,MEDIUM TIP, NO VACUUM CONTROL: Brand: ARGYLE

## (undated) DEVICE — SPLNT SCOTCHCAST CONFRM 3X15IN

## (undated) DEVICE — SUT VIC 3/0 SH 27IN J416H

## (undated) DEVICE — DRSNG WND GZ CURAD OIL EMULSION 3X3IN STRL